# Patient Record
Sex: FEMALE | Race: WHITE | NOT HISPANIC OR LATINO | Employment: FULL TIME | URBAN - METROPOLITAN AREA
[De-identification: names, ages, dates, MRNs, and addresses within clinical notes are randomized per-mention and may not be internally consistent; named-entity substitution may affect disease eponyms.]

---

## 2017-01-04 ENCOUNTER — OFFICE VISIT (OUTPATIENT)
Dept: MEDICAL GROUP | Facility: MEDICAL CENTER | Age: 37
End: 2017-01-04
Payer: COMMERCIAL

## 2017-01-04 VITALS
TEMPERATURE: 97.5 F | SYSTOLIC BLOOD PRESSURE: 126 MMHG | BODY MASS INDEX: 24.06 KG/M2 | OXYGEN SATURATION: 98 % | DIASTOLIC BLOOD PRESSURE: 90 MMHG | HEART RATE: 88 BPM | HEIGHT: 66 IN | WEIGHT: 149.69 LBS | RESPIRATION RATE: 12 BRPM

## 2017-01-04 DIAGNOSIS — G43.109 MIGRAINE WITH AURA AND WITHOUT STATUS MIGRAINOSUS, NOT INTRACTABLE: ICD-10-CM

## 2017-01-04 DIAGNOSIS — N93.9 ABNORMAL UTERINE BLEEDING: ICD-10-CM

## 2017-01-04 DIAGNOSIS — F32.1 MODERATE SINGLE CURRENT EPISODE OF MAJOR DEPRESSIVE DISORDER (HCC): ICD-10-CM

## 2017-01-04 DIAGNOSIS — Z00.00 HEALTH MAINTENANCE EXAMINATION: ICD-10-CM

## 2017-01-04 DIAGNOSIS — Z23 NEED FOR VACCINATION: ICD-10-CM

## 2017-01-04 PROCEDURE — 90686 IIV4 VACC NO PRSV 0.5 ML IM: CPT | Performed by: FAMILY MEDICINE

## 2017-01-04 PROCEDURE — 99214 OFFICE O/P EST MOD 30 MIN: CPT | Mod: 25 | Performed by: FAMILY MEDICINE

## 2017-01-04 PROCEDURE — 90471 IMMUNIZATION ADMIN: CPT | Performed by: FAMILY MEDICINE

## 2017-01-04 RX ORDER — RIZATRIPTAN BENZOATE 10 MG/1
10 TABLET ORAL
COMMUNITY
End: 2017-01-04 | Stop reason: SDUPTHER

## 2017-01-04 RX ORDER — RIZATRIPTAN BENZOATE 10 MG/1
10 TABLET ORAL
Qty: 10 TAB | Refills: 11 | Status: SHIPPED | OUTPATIENT
Start: 2017-01-04 | End: 2018-05-22 | Stop reason: SDUPTHER

## 2017-01-04 RX ORDER — CITALOPRAM 20 MG/1
20 TABLET ORAL DAILY
Qty: 30 TAB | Refills: 5 | Status: SHIPPED | OUTPATIENT
Start: 2017-01-04 | End: 2017-02-16

## 2017-01-04 ASSESSMENT — PATIENT HEALTH QUESTIONNAIRE - PHQ9: CLINICAL INTERPRETATION OF PHQ2 SCORE: 2

## 2017-01-04 NOTE — PROGRESS NOTES
Amber Casey is a 36 y.o. female here for   Chief Complaint   Patient presents with   • Establish Care   • Medication Refill       HPI: Amber is a very pleasant 36-year-old female. She moved to [place] about 1-1/2 years ago. She has 3 children. She is a stay-at-home mom. She lives up in the [place] area.    1. Moderate single current episode of major depressive disorder (HCC)  This is a new problem. She has never discussed this with her provider in the past. She reports one and a half years of feeling down. She didn't move her Raynaud about 1-1/2 years ago. She is feeling like she is not established. She has 3 young children. She stays home with her children and feels like she does not have a good social life. Recently, she is finding difficulty with motivation even getting out of the House. She feels overwhelmed with starting her day. She denies any suicidality. She also has associated anxiety. Anxiety mostly comes when it is time for her to leave the house.    2. Migraine with aura and without status migrainosus, not intractable  This is chronic. She would like a refill of Maxalt. She gets about 6 migraines a year. They're well controlled if she is able to take her triptan. She is not on any birth control pills. She does get aura with her migraine.    3. Need for vaccination  Patient needs her flu shot  Tetanus was done 3 years ago    4. Health maintenance examination  Pap: Done on 11/30/16  Needs flu shot  Tetanus done 3 years ago  Exercise rare    6. Abnormal uterine bleeding  This was been going on for the last 3 months. It is a new problem. She had an US done in the last 2 weeks.   Patient is having breakthrough bleeding with her Mirena IUD. This IUD has been present for the last 3 years. Labs ordered to look for thyroid dysfunction. Transvaginal ultrasound reviewed with the patient which is essentially normal other than slightly enlarged ovarian cysts. Patient to follow-up with OB/GYN. She is considering  "endometrial ablation.       TVUS:   Normal transvaginal appearance of the uterus with sonographically well-positioned IUD    Right larger than left simple ovarian cysts are mildly larger than typical but do not have features suspicious for malignancy  Current medicines (including changes today)  Current Outpatient Prescriptions   Medication Sig Dispense Refill   • citalopram (CELEXA) 20 MG Tab Take 1 Tab by mouth every day. 30 Tab 5   • rizatriptan (MAXALT) 10 MG tablet Take 1 Tab by mouth Once PRN for Migraine. 10 Tab 11     No current facility-administered medications for this visit.     She  has a past medical history of Anxiety; Depression; and Migraine.  She  has past surgical history that includes tonsillectomy.  Social History   Substance Use Topics   • Smoking status: Never Smoker    • Smokeless tobacco: Never Used   • Alcohol Use: Yes      Comment: Occ     Social History     Social History Narrative     Family History   Problem Relation Age of Onset   • Psychiatry Mother    • Hypertension Mother    • Hyperlipidemia Mother    • Hypertension Father    • Hyperlipidemia Father    • Diabetes Paternal Grandmother      Family Status   Relation Status Death Age   • Mother Alive    • Father Alive          ROS  Positive for depression, anxiety, occasional headache, vaginal bleeding  All other systems reviewed and are negative     Objective:     Blood pressure 126/90, pulse 88, temperature 36.4 °C (97.5 °F), resp. rate 12, height 1.676 m (5' 6\"), weight 67.9 kg (149 lb 11.1 oz), SpO2 98 %. Body mass index is 24.17 kg/(m^2).  Physical Exam:    Constitutional: Alert, no distress.  Skin: Warm, dry, good turgor, no rashes in visible areas.  Eye: Equal, round and reactive, conjunctiva clear, lids normal.  ENMT: Lips without lesions, good dentition, oropharynx clear.  Neck: Trachea midline, no masses, no thyromegaly. No cervical or supraclavicular lymphadenopathy.  Respiratory: Unlabored respiratory effort, lungs clear to " auscultation, no wheezes, no ronchi.  Cardiovascular: Normal S1, S2, no murmur, no edema.  Abdomen: Soft, non-tender, no masses, no hepatosplenomegaly.  Psych: Alert and oriented x3, tearful.        Assessment and Plan:   The following treatment plan was discussed    1. Moderate single current episode of major depressive disorder (HCC)  This is a new problem  Labs ordered to look for organic causes of depression and anxiety  Discussed at length a diagnosis of depression and anxiety  Encourage daily exercise, outdoor activities  Referral to psychology for therapy  Celexa prescribed, discussed possible side effects  Follow-up in 6 weeks  - citalopram (CELEXA) 20 MG Tab; Take 1 Tab by mouth every day.  Dispense: 30 Tab; Refill: 5  - REFERRAL TO PSYCHOLOGY    2. Migraine with aura and without status migrainosus, not intractable  Chronic, stable   Refill Maxalt  - rizatriptan (MAXALT) 10 MG tablet; Take 1 Tab by mouth Once PRN for Migraine.  Dispense: 10 Tab; Refill: 11    3. Need for vaccination  Flu shot given  - INFLUENZA VACCINE QUAD INJ >3Y(PF)    4. Health maintenance examination  Pap and vaccines up-to-date  Labs ordered   Exercise discussed  - COMP METABOLIC PANEL; Future  - LIPID PROFILE; Future  - TSH WITH REFLEX TO FT4; Future  - VITAMIN D,25 HYDROXY; Future  - CBC WITH DIFFERENTIAL; Future    5. Abnormal uterine bleeding  This is a new problem. Patient is having breakthrough bleeding with her Mirena IUD. This IUD has been present for the last 3 years. Labs ordered to look for thyroid dysfunction. Transvaginal ultrasound reviewed with the patient which is essentially normal other than slightly enlarged ovarian cysts. Patient to follow-up with OB/GYN. She is considering endometrial ablation.     Records requested.  Followup: Return in about 6 weeks (around 2/15/2017) for labs, depression, anxiety, vaginal bleeding, long.

## 2017-01-04 NOTE — MR AVS SNAPSHOT
"        Amber Casey   2017 9:20 AM   Office Visit   MRN: [id number]    Department:  [place] [address]   Dept Phone:  [phone]    Description:  Female : 1980   Provider:  Chasity Head M.D.           Reason for Visit     Establish Care     Medication Refill           Allergies as of 2017     No Known Allergies      You were diagnosed with     Migraine with aura and without status migrainosus, not intractable   [994971]       Moderate single current episode of major depressive disorder (HCC)   [6618969]       Health maintenance examination   [245468]       Need for vaccination   [718020]         Vital Signs     Blood Pressure Pulse Temperature Respirations Height Weight    126/90 mmHg 88 36.4 °C (97.5 °F) 12 1.676 m (5' 6\") 67.9 kg (149 lb 11.1 oz)    Body Mass Index Oxygen Saturation Smoking Status             24.17 kg/m2 98% Never Smoker          Basic Information     Date Of Birth Sex Race Ethnicity Preferred Language    1980 Female White Unknown English      Your appointments     2017  9:40 AM   Established Patient with Chasity Head M.D.   [facility]    [address]  [address]  [facility] [address] [phone]           You will be receiving a confirmation call a few days before your appointment from our automated call confirmation system.              Problem List              ICD-10-CM Priority Class Noted - Resolved    Migraine with aura and without status migrainosus, not intractable G43.109   2017 - Present    Moderate single current episode of major depressive disorder (HCC) F32.1   2017 - Present      Health Maintenance        Date Due Completion Dates    IMM DTaP/Tdap/Td Vaccine (1 - Tdap) 1999 ---    IMM INFLUENZA (1) 2016 ---    PAP SMEAR 2019            Current Immunizations     Influenza Vaccine Quad Inj (Pf)  Incomplete      Below and/or attached are the " medications your provider expects you to take. Review all of your home medications and newly ordered medications with your provider and/or pharmacist. Follow medication instructions as directed by your provider and/or pharmacist. Please keep your medication list with you and share with your provider. Update the information when medications are discontinued, doses are changed, or new medications (including over-the-counter products) are added; and carry medication information at all times in the event of emergency situations     Allergies:  No Known Allergies          Medications  Valid as of: January 04, 2017 - 10:06 AM    Generic Name Brand Name Tablet Size Instructions for use    Citalopram Hydrobromide (Tab) CELEXA 20 MG Take 1 Tab by mouth every day.        Rizatriptan Benzoate (Tab) MAXALT 10 MG Take 1 Tab by mouth Once PRN for Migraine.        .                 Medicines prescribed today were sent to:     [facility]/PHARMACY #[id number] - [place], [place] - [address]    [address] [place] [address]    Phone: [phone] Fax: [phone]    Open 24 Hours?: No      Medication refill instructions:       If your prescription bottle indicates you have medication refills left, it is not necessary to call your provider’s office. Please contact your pharmacy and they will refill your medication.    If your prescription bottle indicates you do not have any refills left, you may request refills at any time through one of the following ways: The online [facility] system (except Urgent Care), by calling your provider’s office, or by asking your pharmacy to contact your provider’s office with a refill request. Medication refills are processed only during regular business hours and may not be available until the next business day. Your provider may request additional information or to have a follow-up visit with you prior to refilling your medication.   *Please Note: Medication refills are assigned a new Rx number when  refilled electronically. Your pharmacy may indicate that no refills were authorized even though a new prescription for the same medication is available at the pharmacy. Please request the medicine by name with the pharmacy before contacting your provider for a refill.        Your To Do List     Future Labs/Procedures Complete By Expires    CBC WITH DIFFERENTIAL  As directed 1/5/2018    COMP METABOLIC PANEL  As directed 1/5/2018    LIPID PROFILE  As directed 1/5/2018    TSH WITH REFLEX TO FT4  As directed 1/4/2018    VITAMIN D,25 HYDROXY  As directed 1/5/2018         [employer] [place] Access Code: [email or web address]  Expires: 2/3/2017 10:06 AM    Sales Beach  A secure, online tool to manage your health information     [employer]’s Sales Beach® is a secure, online tool that connects you to your personalized health information from the privacy of your home -- day or night - making it very easy for you to manage your healthcare. Once the activation process is completed, you can even access your medical information using the Sales Beach yousif, which is available for free in the Apple Yousif store or Google Play store.     Sales Beach provides the following levels of access (as shown below):   My Chart Features   [facility] Doctor [facility]  Specialists [facility]  Urgent  Care Non-[facility]  Primary Care  Doctor   Email your healthcare team securely and privately 24/7 X X X    Manage appointments: schedule your next appointment; view details of past/upcoming appointments X      Request prescription refills. X      View recent personal medical records, including lab and immunizations X X X X   View health record, including health history, allergies, medications X X X X   Read reports about your outpatient visits, procedures, consult and ER notes X X X X   See your discharge summary, which is a recap of your hospital and/or ER visit that includes your diagnosis, lab results, and care plan. X X       How to register for [place]:  1. Go to   [email or web address].  2. Click on the Sign Up Now box, which takes you to the New Member Sign Up page. You will need to provide the following information:  a. Enter your JLGOV Access Code exactly as it appears at the top of this page. (You will not need to use this code after you’ve completed the sign-up process. If you do not sign up before the expiration date, you must request a new code.)   b. Enter your date of birth.   c. Enter your home email address.   d. Click Submit, and follow the next screen’s instructions.  3. Create a JLGOV ID. This will be your JLGOV login ID and cannot be changed, so think of one that is secure and easy to remember.  4. Create a WoraPayt password. You can change your password at any time.  5. Enter your Password Reset Question and Answer. This can be used at a later time if you forget your password.   6. Enter your e-mail address. This allows you to receive e-mail notifications when new information is available in JLGOV.  7. Click Sign Up. You can now view your health information.    For assistance activating your JLGOV account, call [phone]

## 2017-02-07 ENCOUNTER — HOSPITAL ENCOUNTER (OUTPATIENT)
Dept: LAB | Facility: MEDICAL CENTER | Age: 37
End: 2017-02-07
Attending: FAMILY MEDICINE
Payer: COMMERCIAL

## 2017-02-07 DIAGNOSIS — Z00.00 HEALTH MAINTENANCE EXAMINATION: ICD-10-CM

## 2017-02-07 LAB
25(OH)D3 SERPL-MCNC: 27 NG/ML (ref 30–100)
ALBUMIN SERPL BCP-MCNC: 4.2 G/DL (ref 3.2–4.9)
ALBUMIN/GLOB SERPL: 1.4 G/DL
ALP SERPL-CCNC: 32 U/L (ref 30–99)
ALT SERPL-CCNC: 14 U/L (ref 2–50)
ANION GAP SERPL CALC-SCNC: 8 MMOL/L (ref 0–11.9)
AST SERPL-CCNC: 15 U/L (ref 12–45)
BASOPHILS # BLD AUTO: 0.7 % (ref 0–1.8)
BASOPHILS # BLD: 0.04 K/UL (ref 0–0.12)
BILIRUB SERPL-MCNC: 1 MG/DL (ref 0.1–1.5)
BUN SERPL-MCNC: 11 MG/DL (ref 8–22)
CALCIUM SERPL-MCNC: 9.1 MG/DL (ref 8.4–10.2)
CHLORIDE SERPL-SCNC: 104 MMOL/L (ref 96–112)
CHOLEST SERPL-MCNC: 167 MG/DL (ref 100–199)
CO2 SERPL-SCNC: 27 MMOL/L (ref 20–33)
CREAT SERPL-MCNC: 0.63 MG/DL (ref 0.5–1.4)
EOSINOPHIL # BLD AUTO: 0.29 K/UL (ref 0–0.51)
EOSINOPHIL NFR BLD: 5.1 % (ref 0–6.9)
ERYTHROCYTE [DISTWIDTH] IN BLOOD BY AUTOMATED COUNT: 38.8 FL (ref 35.9–50)
GFR SERPL CREATININE-BSD FRML MDRD: >60 ML/MIN/1.73 M 2
GLOBULIN SER CALC-MCNC: 2.9 G/DL (ref 1.9–3.5)
GLUCOSE SERPL-MCNC: 80 MG/DL (ref 65–99)
HCT VFR BLD AUTO: 41.6 % (ref 37–47)
HDLC SERPL-MCNC: 49 MG/DL
HGB BLD-MCNC: 14.4 G/DL (ref 12–16)
IMM GRANULOCYTES # BLD AUTO: 0.01 K/UL (ref 0–0.11)
IMM GRANULOCYTES NFR BLD AUTO: 0.2 % (ref 0–0.9)
LDLC SERPL CALC-MCNC: 108 MG/DL
LYMPHOCYTES # BLD AUTO: 1.64 K/UL (ref 1–4.8)
LYMPHOCYTES NFR BLD: 28.8 % (ref 22–41)
MCH RBC QN AUTO: 31.8 PG (ref 27–33)
MCHC RBC AUTO-ENTMCNC: 34.6 G/DL (ref 33.6–35)
MCV RBC AUTO: 91.8 FL (ref 81.4–97.8)
MONOCYTES # BLD AUTO: 0.37 K/UL (ref 0–0.85)
MONOCYTES NFR BLD AUTO: 6.5 % (ref 0–13.4)
NEUTROPHILS # BLD AUTO: 3.35 K/UL (ref 2–7.15)
NEUTROPHILS NFR BLD: 58.7 % (ref 44–72)
NRBC # BLD AUTO: 0 K/UL
NRBC BLD AUTO-RTO: 0 /100 WBC
PLATELET # BLD AUTO: 235 K/UL (ref 164–446)
PMV BLD AUTO: 9.8 FL (ref 9–12.9)
POTASSIUM SERPL-SCNC: 3.6 MMOL/L (ref 3.6–5.5)
PROT SERPL-MCNC: 7.1 G/DL (ref 6–8.2)
RBC # BLD AUTO: 4.53 M/UL (ref 4.2–5.4)
SODIUM SERPL-SCNC: 139 MMOL/L (ref 135–145)
TRIGL SERPL-MCNC: 52 MG/DL (ref 0–149)
TSH SERPL DL<=0.005 MIU/L-ACNC: 0.96 UIU/ML (ref 0.35–5.5)
WBC # BLD AUTO: 5.7 K/UL (ref 4.8–10.8)

## 2017-02-07 PROCEDURE — 36415 COLL VENOUS BLD VENIPUNCTURE: CPT

## 2017-02-07 PROCEDURE — 80053 COMPREHEN METABOLIC PANEL: CPT

## 2017-02-07 PROCEDURE — 80061 LIPID PANEL: CPT

## 2017-02-07 PROCEDURE — 85025 COMPLETE CBC W/AUTO DIFF WBC: CPT

## 2017-02-07 PROCEDURE — 82306 VITAMIN D 25 HYDROXY: CPT

## 2017-02-07 PROCEDURE — 84443 ASSAY THYROID STIM HORMONE: CPT

## 2017-02-16 ENCOUNTER — OFFICE VISIT (OUTPATIENT)
Dept: MEDICAL GROUP | Facility: MEDICAL CENTER | Age: 37
End: 2017-02-16
Payer: COMMERCIAL

## 2017-02-16 VITALS
HEART RATE: 78 BPM | SYSTOLIC BLOOD PRESSURE: 122 MMHG | HEIGHT: 66 IN | WEIGHT: 148.15 LBS | BODY MASS INDEX: 23.81 KG/M2 | RESPIRATION RATE: 12 BRPM | TEMPERATURE: 97.4 F | DIASTOLIC BLOOD PRESSURE: 86 MMHG | OXYGEN SATURATION: 97 %

## 2017-02-16 DIAGNOSIS — E78.00 PURE HYPERCHOLESTEROLEMIA: ICD-10-CM

## 2017-02-16 DIAGNOSIS — E55.9 VITAMIN D DEFICIENCY: ICD-10-CM

## 2017-02-16 DIAGNOSIS — F32.1 MODERATE SINGLE CURRENT EPISODE OF MAJOR DEPRESSIVE DISORDER (HCC): ICD-10-CM

## 2017-02-16 PROCEDURE — 99214 OFFICE O/P EST MOD 30 MIN: CPT | Performed by: FAMILY MEDICINE

## 2017-02-16 RX ORDER — FLUOXETINE 10 MG/1
10 CAPSULE ORAL DAILY
Qty: 30 CAP | Refills: 2 | Status: SHIPPED | OUTPATIENT
Start: 2017-02-16 | End: 2017-04-11 | Stop reason: SDUPTHER

## 2017-02-16 NOTE — PROGRESS NOTES
Subjective:   Amber Ocasio is a 36 y.o. female here today for   Chief Complaint   Patient presents with   • Follow-Up     labs and meds       1. Moderate single current episode of major depressive disorder (CMS-HCC)  This is chronic. At the last appointment, we started Celexa 10 mg daily. Patient then tried to increase to 20 mg daily. She states that this has helped with her anxiety, but she is feeling quite sedated on this. She states that it feels like she is on Benadryl. She did have some nausea the first week on this medication but this has since resolved. She denies any suicidality. She is sleeping okay. She is no longer having panic attacks when leaving the house. We did labs to evaluate for secondary causes of depression which were all negative. Her vitamin D is slightly low and she is taking supplement for this.    Lab Results   Component Value Date/Time    SODIUM 139 02/07/2017 10:16 AM    POTASSIUM 3.6 02/07/2017 10:16 AM    CHLORIDE 104 02/07/2017 10:16 AM    CO2 27 02/07/2017 10:16 AM    GLUCOSE 80 02/07/2017 10:16 AM    BUN 11 02/07/2017 10:16 AM    CREATININE 0.63 02/07/2017 10:16 AM      Lab Results   Component Value Date/Time    WBC 5.7 02/07/2017 10:16 AM    RBC 4.53 02/07/2017 10:16 AM    HEMOGLOBIN 14.4 02/07/2017 10:16 AM    HEMATOCRIT 41.6 02/07/2017 10:16 AM    MCV 91.8 02/07/2017 10:16 AM    MCH 31.8 02/07/2017 10:16 AM    MCHC 34.6 02/07/2017 10:16 AM    MPV 9.8 02/07/2017 10:16 AM    NEUTROPHILS-POLYS 58.70 02/07/2017 10:16 AM    LYMPHOCYTES 28.80 02/07/2017 10:16 AM    MONOCYTES 6.50 02/07/2017 10:16 AM    EOSINOPHILS 5.10 02/07/2017 10:16 AM    BASOPHILS 0.70 02/07/2017 10:16 AM      Results for AMBER OCASIO (MRN [id number]) as of 2/16/2017 10:12   Ref. Range 2/7/2017 10:16   25-Hydroxy   Vitamin D 25 Latest Ref Range:  ng/mL 27 (L)   TSH Latest Ref Range: 0.350-5.500 uIU/mL 0.960       2. Vitamin D deficiency  This is chronic. She is currently taking 1000 units  "vitamin D daily.  Results for ESMER OCASIO (MRN [id number]) as of 2/16/2017 10:12   Ref. Range 2/7/2017 10:16   25-Hydroxy   Vitamin D 25 Latest Ref Range:  ng/mL 27 (L)     3. Pure hypercholesterolemia  This is a new problem. She has strong family history of hypercholesterolemia in her siblings and parents. She is her only sibling not on medication. She does not eat a large amount of red meat, milk, butter, or other saturated fats. She does eat some cheese. She does not eat many carbohydrates or sugars.  Results for ESMER OCASIO (MRN [id number]) as of 2/16/2017 10:12   Ref. Range 2/7/2017 10:16   Cholesterol,Tot Latest Ref Range: 100-199 mg/dL 167   Triglycerides Latest Ref Range: 0-149 mg/dL 52   HDL Latest Ref Range: >=40 mg/dL 49   LDL Latest Ref Range: <100 mg/dL 108 (H)       Current medicines (including changes today)  Current Outpatient Prescriptions   Medication Sig Dispense Refill   • fluoxetine (PROZAC) 10 MG Cap Take 1 Cap by mouth every day. 30 Cap 2   • rizatriptan (MAXALT) 10 MG tablet Take 1 Tab by mouth Once PRN for Migraine. 10 Tab 11     No current facility-administered medications for this visit.     She  has a past medical history of Anxiety; Depression; and Migraine.    ROS   No fevers  No bowel changes  No LE edema  Positive for nausea     Objective:     Blood pressure 122/86, pulse 78, temperature 36.3 °C (97.4 °F), resp. rate 12, height 1.676 m (5' 5.98\"), weight 67.2 kg (148 lb 2.4 oz), SpO2 97 %. Body mass index is 23.92 kg/(m^2).   Physical Exam:  Constitutional: Alert, no distress.  Skin: Warm, dry, good turgor, no rashes in visible areas.  Eye: conjunctiva clear, lids normal.  ENMT: Lips without lesions, good dentition.  Neck: Trachea midline  Respiratory: Unlabored respiratory effort  Cardiovascular: Regular rate  Psych: Alert and oriented x3, normal affect and mood.        Assessment and Plan:   The following treatment plan was discussed    1. Moderate single " current episode of major depressive disorder (CMS-HCC)  Chronic, unstable. Side effects from Celexa including sedation  Change to Prozac 10 mg and we will increase as needed  Follow-up again in 6 weeks  Labs reviewed as above  - fluoxetine (PROZAC) 10 MG Cap; Take 1 Cap by mouth every day.  Dispense: 30 Cap; Refill: 2    2. Vitamin D deficiency  Chronic, stable  Increase vitamin D supplementation to 2000 units during winter months    3. Pure hypercholesterolemia  This is a new problem. Discussed lifestyle modifications. She does have a strong family history of hyperlipidemia and would like to stay off of medications. Her  ASCVD risk is very low and I do not think that she needs medication at this time.      Followup: Return in about 6 weeks (around 3/30/2017) for depression .       This note was created using voice recognition software. I have made every reasonable attempt to correct errors, however, I do anticipate some grammatical errors.

## 2017-02-16 NOTE — MR AVS SNAPSHOT
"        Amber Casey   2017 9:40 AM   Office Visit   MRN: [id number]    Department:  [place] [address]   Dept Phone:  [phone]    Description:  Female : 1980   Provider:  Chasity Head M.D.           Reason for Visit     Follow-Up labs and meds      Allergies as of 2017     No Known Allergies      You were diagnosed with     Moderate single current episode of major depressive disorder (CMS-[facility])   [4160159]       Vitamin D deficiency   [8578634]       Pure hypercholesterolemia   [272.0.ICD-9-CM]         Vital Signs     Blood Pressure Pulse Temperature Respirations Height Weight    122/86 mmHg 78 36.3 °C (97.4 °F) 12 1.676 m (5' 5.98\") 67.2 kg (148 lb 2.4 oz)    Body Mass Index Oxygen Saturation Smoking Status             23.92 kg/m2 97% Never Smoker          Basic Information     Date Of Birth Sex Race Ethnicity Preferred Language    1980 Female White Non- English      Your appointments     2017  9:40 AM   Established Patient with Chasity Head M.D.   [facility] (--)    [address] [address] [phone]           You will be receiving a confirmation call a few days before your appointment from our automated call confirmation system.              Problem List              ICD-10-CM Priority Class Noted - Resolved    Migraine with aura and without status migrainosus, not intractable G43.109   2017 - Present    Moderate single current episode of major depressive disorder (CMS-[facility]) F32.1   2017 - Present    Abnormal uterine bleeding N93.9   2017 - Present    Vitamin D deficiency E55.9   2017 - Present    Pure hypercholesterolemia E78.00   2017 - Present      Health Maintenance        Date Due Completion Dates    IMM DTaP/Tdap/Td Vaccine (1 - Tdap) 1999 ---    PAP SMEAR 2019            Current Immunizations     Influenza Vaccine Quad Inj (Pf) 2017   "      Below and/or attached are the medications your provider expects you to take. Review all of your home medications and newly ordered medications with your provider and/or pharmacist. Follow medication instructions as directed by your provider and/or pharmacist. Please keep your medication list with you and share with your provider. Update the information when medications are discontinued, doses are changed, or new medications (including over-the-counter products) are added; and carry medication information at all times in the event of emergency situations     Allergies:  No Known Allergies          Medications  Valid as of: February 16, 2017 - 10:11 AM    Generic Name Brand Name Tablet Size Instructions for use    FLUoxetine HCl (Cap) PROZAC 10 MG Take 1 Cap by mouth every day.        Rizatriptan Benzoate (Tab) MAXALT 10 MG Take 1 Tab by mouth Once PRN for Migraine.        .                 Medicines prescribed today were sent to:     [facility]/PHARMACY #[id number] - [place], [place] - [address]    [address] [place] [address]    Phone: [phone] Fax: [phone]    Open 24 Hours?: No      Medication refill instructions:       If your prescription bottle indicates you have medication refills left, it is not necessary to call your provider’s office. Please contact your pharmacy and they will refill your medication.    If your prescription bottle indicates you do not have any refills left, you may request refills at any time through one of the following ways: The online [employer] system (except Urgent Care), by calling your provider’s office, or by asking your pharmacy to contact your provider’s office with a refill request. Medication refills are processed only during regular business hours and may not be available until the next business day. Your provider may request additional information or to have a follow-up visit with you prior to refilling your medication.   *Please Note: Medication refills are assigned a  new Rx number when refilled electronically. Your pharmacy may indicate that no refills were authorized even though a new prescription for the same medication is available at the pharmacy. Please request the medicine by name with the pharmacy before contacting your provider for a refill.           MyChart Access Code: Activation code not generated  Current [email or web address] Status: Active

## 2017-04-11 ENCOUNTER — OFFICE VISIT (OUTPATIENT)
Dept: MEDICAL GROUP | Facility: LAB | Age: 37
End: 2017-04-11
Payer: COMMERCIAL

## 2017-04-11 ENCOUNTER — APPOINTMENT (OUTPATIENT)
Dept: MEDICAL GROUP | Facility: LAB | Age: 37
End: 2017-04-11
Payer: COMMERCIAL

## 2017-04-11 VITALS
RESPIRATION RATE: 12 BRPM | TEMPERATURE: 98.2 F | HEIGHT: 66 IN | DIASTOLIC BLOOD PRESSURE: 80 MMHG | SYSTOLIC BLOOD PRESSURE: 106 MMHG | BODY MASS INDEX: 23.07 KG/M2 | HEART RATE: 83 BPM | OXYGEN SATURATION: 100 % | WEIGHT: 143.52 LBS

## 2017-04-11 DIAGNOSIS — E78.00 PURE HYPERCHOLESTEROLEMIA: ICD-10-CM

## 2017-04-11 DIAGNOSIS — E55.9 VITAMIN D DEFICIENCY: ICD-10-CM

## 2017-04-11 DIAGNOSIS — F32.1 MODERATE SINGLE CURRENT EPISODE OF MAJOR DEPRESSIVE DISORDER (HCC): ICD-10-CM

## 2017-04-11 DIAGNOSIS — Z00.00 HEALTH MAINTENANCE EXAMINATION: ICD-10-CM

## 2017-04-11 PROCEDURE — 99395 PREV VISIT EST AGE 18-39: CPT | Performed by: FAMILY MEDICINE

## 2017-04-11 RX ORDER — FLUOXETINE HYDROCHLORIDE 20 MG/1
20 CAPSULE ORAL DAILY
Qty: 90 CAP | Refills: 3 | Status: SHIPPED | OUTPATIENT
Start: 2017-04-11 | End: 2018-04-04 | Stop reason: SDUPTHER

## 2017-04-11 NOTE — MR AVS SNAPSHOT
"        Amber Casey   2017 10:40 AM   Office Visit   MRN: [id number]    Department:  [facility]   Dept Phone:  [phone]    Description:  Female : 1980   Provider:  Chasity Head M.D.           Reason for Visit     Follow-Up     Depression meds      Allergies as of 2017     No Known Allergies      You were diagnosed with     Moderate single current episode of major depressive disorder (CMS-HCC)   [4853165]       Vitamin D deficiency   [8548594]       Pure hypercholesterolemia   [272.0.ICD-9-CM]       Health maintenance examination   [603160]         Vital Signs     Blood Pressure Pulse Temperature Respirations Height Weight    106/80 mmHg 83 36.8 °C (98.2 °F) 12 1.676 m (5' 5.98\") 65.1 kg (143 lb 8.3 oz)    Body Mass Index Oxygen Saturation Smoking Status             23.18 kg/m2 100% Never Smoker          Basic Information     Date Of Birth Sex Race Ethnicity Preferred Language    1980 Female White Non- English      Problem List              ICD-10-CM Priority Class Noted - Resolved    Migraine with aura and without status migrainosus, not intractable G43.109   2017 - Present    Moderate single current episode of major depressive disorder (CMS-HCC) F32.1   2017 - Present    Abnormal uterine bleeding N93.9   2017 - Present    Vitamin D deficiency E55.9   2017 - Present    Pure hypercholesterolemia E78.00   2017 - Present      Health Maintenance        Date Due Completion Dates    IMM DTaP/Tdap/Td Vaccine (1 - Tdap) 1999 ---    PAP SMEAR 2019            Current Immunizations     Influenza Vaccine Quad Inj (Pf) 2017      Below and/or attached are the medications your provider expects you to take. Review all of your home medications and newly ordered medications with your provider and/or pharmacist. Follow medication instructions as directed by your provider and/or pharmacist. Please keep your medication list " with you and share with your provider. Update the information when medications are discontinued, doses are changed, or new medications (including over-the-counter products) are added; and carry medication information at all times in the event of emergency situations     Allergies:  No Known Allergies          Medications  Valid as of: April 11, 2017 - 10:50 AM    Generic Name Brand Name Tablet Size Instructions for use    FLUoxetine HCl (Cap) PROZAC 20 MG Take 1 Cap by mouth every day.        Rizatriptan Benzoate (Tab) MAXALT 10 MG Take 1 Tab by mouth Once PRN for Migraine.        .                 Medicines prescribed today were sent to:     [facility][id number] - [place] [place] [address]    [address] [place] [address]    Phone: [phone] Fax: [phone]    Open 24 Hours?: No      Medication refill instructions:       If your prescription bottle indicates you have medication refills left, it is not necessary to call your provider’s office. Please contact your pharmacy and they will refill your medication.    If your prescription bottle indicates you do not have any refills left, you may request refills at any time through one of the following ways: The online [employer] system (except Urgent Care), by calling your provider’s office, or by asking your pharmacy to contact your provider’s office with a refill request. Medication refills are processed only during regular business hours and may not be available until the next business day. Your provider may request additional information or to have a follow-up visit with you prior to refilling your medication.   *Please Note: Medication refills are assigned a new Rx number when refilled electronically. Your pharmacy may indicate that no refills were authorized even though a new prescription for the same medication is available at the pharmacy. Please request the medicine by name with the pharmacy before contacting your provider for a refill.        Your  To Do List     Future Labs/Procedures Complete By Expires    COMP METABOLIC PANEL  As directed 4/12/2018    LIPID PROFILE  As directed 4/12/2018    TSH WITH REFLEX TO FT4  As directed 4/11/2018    VITAMIN D,25 HYDROXY  As directed 4/12/2018         MyChart Access Code: Activation code not generated  Current MyChart Status: Active

## 2017-04-11 NOTE — PROGRESS NOTES
Subjective:   Amber Casey is a 36 y.o. female here today for annual, depression  Chief Complaint   Patient presents with   • Follow-Up   • Depression     meds       1. Health maintenance examination  Pap: Last done 11/30/16, up to date  STD screen: Monogamous  Mammogram: Not indicated  Colonoscopy: Not indicated  DEXA: Not indicated  Tetanus booster: Last done 3 years ago during pregnancy  Flu vaccine: Done yearly   Diet: healthy   Exercise: Has started walking    2. Moderate single current episode of major depressive disorder (CMS-HCC)  This is chronic. Patient did try Celexa but was left with a groggy feeling in the morning. At last visit, we changed her to Prozac 10 mg daily. She started taking 20 mg after a few weeks because she felt like she was at a plateau. She feels like 20 mg of Prozac is working well for her. She is sleeping well at night and does not feel groggy in the morning. She denies any suicidality. Her anxiety is under control. She is able to get out and walk and go to social events without any significant concerns.    3. Vitamin D deficiency  This is chronic. Patient started vitamin D supplementation and did increase this to 2000 units daily. She is feeling good on this dose.    4. Pure hypercholesterolemia  This is chronic. Patient had a slightly elevated LDL level above 100. She has lost 5 pounds and is working on exercise.      Current medicines (including changes today)  Current Outpatient Prescriptions   Medication Sig Dispense Refill   • fluoxetine (PROZAC) 20 MG Cap Take 1 Cap by mouth every day. 90 Cap 3   • rizatriptan (MAXALT) 10 MG tablet Take 1 Tab by mouth Once PRN for Migraine. 10 Tab 11     No current facility-administered medications for this visit.     She  has a past medical history of Anxiety; Depression; and Migraine.    ROS   No fevers  No bowel changes  No nausea       Objective:     Blood pressure 106/80, pulse 83, temperature 36.8 °C (98.2 °F), resp. rate 12,  "height 1.676 m (5' 5.98\"), weight 65.1 kg (143 lb 8.3 oz), SpO2 100 %. Body mass index is 23.18 kg/(m^2).   Physical Exam:  Constitutional: Alert, no distress.  Skin: Warm, dry, good turgor, no rashes in visible areas.  Eye: Equal, round and reactive, conjunctiva clear, lids normal.  ENMT: Lips without lesions, good dentition, oropharynx clear.  Neck: Trachea midline, no masses, no thyromegaly. No cervical or supraclavicular lymphadenopathy  Respiratory: Unlabored respiratory effort, lungs clear to auscultation, no wheezes, no ronchi.  Cardiovascular: Normal S1, S2, RRR, no murmur, no edema.  Abdomen: Soft, non-tender, no masses, no hepatosplenomegaly.  Psych: Alert and oriented x3, normal affect and mood.      Assessment and Plan:   The following treatment plan was discussed    1. Health maintenance examination  Labs ordered  Diet and exercise discussed  Vaccinations up-to-date  - LIPID PROFILE; Future  - TSH WITH REFLEX TO FT4; Future  - COMP METABOLIC PANEL; Future  - VITAMIN D,25 HYDROXY; Future    2. Moderate single current episode of major depressive disorder (CMS-HCC)  Chronic, improved  Increase prozac to 20 mg daily   Follow up yearly, sooner with any side effects  - fluoxetine (PROZAC) 20 MG Cap; Take 1 Cap by mouth every day.  Dispense: 90 Cap; Refill: 3    3. Vitamin D deficiency  Chronic, stable  Continue 2000 units of vitamin D daily  Recheck February 2018  - VITAMIN D,25 HYDROXY; Future    4. Pure hypercholesterolemia  Chronic, stable  Patient has lost 5 pounds, encouragement given  Recheck lipid profile with a TSH in 10 months  - LIPID PROFILE; Future  - TSH WITH REFLEX TO FT4; Future      Followup: Return in about 10 months (around 2/11/2018) for Annual, labs.       This note was created using voice recognition software. I have made every reasonable attempt to correct errors, however, I do anticipate some grammatical errors.     "

## 2017-07-21 ENCOUNTER — OFFICE VISIT (OUTPATIENT)
Dept: MEDICAL GROUP | Facility: LAB | Age: 37
End: 2017-07-21
Payer: COMMERCIAL

## 2017-07-21 ENCOUNTER — HOSPITAL ENCOUNTER (OUTPATIENT)
Facility: MEDICAL CENTER | Age: 37
End: 2017-07-21
Attending: FAMILY MEDICINE
Payer: COMMERCIAL

## 2017-07-21 VITALS
OXYGEN SATURATION: 97 % | SYSTOLIC BLOOD PRESSURE: 110 MMHG | WEIGHT: 148 LBS | DIASTOLIC BLOOD PRESSURE: 78 MMHG | BODY MASS INDEX: 23.78 KG/M2 | RESPIRATION RATE: 16 BRPM | HEART RATE: 100 BPM | TEMPERATURE: 97.7 F | HEIGHT: 66 IN

## 2017-07-21 DIAGNOSIS — N30.00 ACUTE CYSTITIS WITHOUT HEMATURIA: ICD-10-CM

## 2017-07-21 LAB
APPEARANCE UR: NORMAL
BILIRUB UR STRIP-MCNC: NORMAL MG/DL
COLOR UR AUTO: NORMAL
GLUCOSE UR STRIP.AUTO-MCNC: NORMAL MG/DL
KETONES UR STRIP.AUTO-MCNC: NORMAL MG/DL
LEUKOCYTE ESTERASE UR QL STRIP.AUTO: NORMAL
NITRITE UR QL STRIP.AUTO: NORMAL
PH UR STRIP.AUTO: 5.5 [PH] (ref 5–8)
PROT UR QL STRIP: NORMAL MG/DL
RBC UR QL AUTO: NORMAL
SP GR UR STRIP.AUTO: 1.03
UROBILINOGEN UR STRIP-MCNC: 0.2 MG/DL

## 2017-07-21 PROCEDURE — 87086 URINE CULTURE/COLONY COUNT: CPT

## 2017-07-21 PROCEDURE — 87077 CULTURE AEROBIC IDENTIFY: CPT

## 2017-07-21 PROCEDURE — 81002 URINALYSIS NONAUTO W/O SCOPE: CPT | Performed by: FAMILY MEDICINE

## 2017-07-21 PROCEDURE — 99000 SPECIMEN HANDLING OFFICE-LAB: CPT | Performed by: FAMILY MEDICINE

## 2017-07-21 PROCEDURE — 87186 SC STD MICRODIL/AGAR DIL: CPT

## 2017-07-21 PROCEDURE — 99214 OFFICE O/P EST MOD 30 MIN: CPT | Performed by: FAMILY MEDICINE

## 2017-07-21 RX ORDER — NITROFURANTOIN 25; 75 MG/1; MG/1
100 CAPSULE ORAL 2 TIMES DAILY
Qty: 10 CAP | Refills: 0 | Status: SHIPPED | OUTPATIENT
Start: 2017-07-21 | End: 2017-07-26

## 2017-07-21 RX ORDER — CLINDAMYCIN PHOSPHATE AND TRETINOIN GEL 1.2%/0.025% 10; .25 MG/G; MG/G
GEL TOPICAL NIGHTLY
COMMUNITY
End: 2020-06-24

## 2017-07-21 NOTE — MR AVS SNAPSHOT
"        Amber Casey   2017 2:20 PM   Office Visit   MRN: [id number]    Department:  [facility]   Dept Phone:  [phone]    Description:  Female : 1980   Provider:  Chasity Head M.D.           Reason for Visit     Dysuria           Allergies as of 2017     No Known Allergies      You were diagnosed with     Acute cystitis without hematuria   [946614]         Vital Signs     Blood Pressure Pulse Temperature Respirations Height Weight    110/78 mmHg 100 36.5 °C (97.7 °F) 16 1.676 m (5' 5.98\") 67.132 kg (148 lb)    Body Mass Index Oxygen Saturation Smoking Status             23.90 kg/m2 97% Never Smoker          Basic Information     Date Of Birth Sex Race Ethnicity Preferred Language    1980 Female White Non- English      Your appointments     2017  2:20 PM   Established Patient with Chasity Head M.D.   [facility] (--)    [address] [address] [id number] [phone]           You will be receiving a confirmation call a few days before your appointment from our automated call confirmation system.              Problem List              ICD-10-CM Priority Class Noted - Resolved    Migraine with aura and without status migrainosus, not intractable G43.109   2017 - Present    Moderate single current episode of major depressive disorder (CMS-HCC) F32.1   2017 - Present    Abnormal uterine bleeding N93.9   2017 - Present    Vitamin D deficiency E55.9   2017 - Present    Pure hypercholesterolemia E78.00   2017 - Present      Health Maintenance        Date Due Completion Dates    IMM INFLUENZA (1) 2017    PAP SMEAR 2019    IMM DTaP/Tdap/Td Vaccine (2 - Td) 2023            Results     POCT Urinalysis      Component Value Standard Range & Units    POC Color alesha Negative    POC Appearance cloudy Negative    POC Leukocyte Esterase + " Negative    POC Nitrites pos Negative    POC Urobiligen 0.2 Negative (0.2) mg/dL    POC Protein + Negative mg/dL    POC Urine PH 5.5 5.0 - 8.0    POC Blood 3+ Negative    POC Specific Gravity 1.030 <1.005 - >1.030    POC Ketones neg Negative mg/dL    POC Biliruben neg Negative mg/dL    POC Glucose neg Negative mg/dL                        Current Immunizations     Influenza Vaccine Quad Inj (Pf) 1/4/2017    Tdap Vaccine 4/11/2013      Below and/or attached are the medications your provider expects you to take. Review all of your home medications and newly ordered medications with your provider and/or pharmacist. Follow medication instructions as directed by your provider and/or pharmacist. Please keep your medication list with you and share with your provider. Update the information when medications are discontinued, doses are changed, or new medications (including over-the-counter products) are added; and carry medication information at all times in the event of emergency situations     Allergies:  No Known Allergies          Medications  Valid as of: July 21, 2017 -  2:11 PM    Generic Name Brand Name Tablet Size Instructions for use    Clindamycin-Tretinoin (Gel) ZIANA 1.2-0.025 % Apply  to affected area(s) every evening.        FLUoxetine HCl (Cap) PROZAC 20 MG Take 1 Cap by mouth every day.        Nitrofurantoin Monohyd Macro (Cap) MACROBID 100 MG Take 1 Cap by mouth 2 times a day for 5 days.        Rizatriptan Benzoate (Tab) MAXALT 10 MG Take 1 Tab by mouth Once PRN for Migraine.        .                 Medicines prescribed today were sent to:     [facility]/PHARMACY #[id number] - [place] - [address]    [address] [place] [address]    Phone: [phone] Fax: [phone]    Open 24 Hours?: No      Medication refill instructions:       If your prescription bottle indicates you have medication refills left, it is not necessary to call your provider’s office. Please contact your pharmacy and they will  refill your medication.    If your prescription bottle indicates you do not have any refills left, you may request refills at any time through one of the following ways: The online Glass system (except Urgent Care), by calling your provider’s office, or by asking your pharmacy to contact your provider’s office with a refill request. Medication refills are processed only during regular business hours and may not be available until the next business day. Your provider may request additional information or to have a follow-up visit with you prior to refilling your medication.   *Please Note: Medication refills are assigned a new Rx number when refilled electronically. Your pharmacy may indicate that no refills were authorized even though a new prescription for the same medication is available at the pharmacy. Please request the medicine by name with the pharmacy before contacting your provider for a refill.           Glass Access Code: Activation code not generated  Current Glass Status: Active

## 2017-07-22 DIAGNOSIS — N30.00 ACUTE CYSTITIS WITHOUT HEMATURIA: ICD-10-CM

## 2017-07-23 NOTE — PROGRESS NOTES
"Chief Complaint   Patient presents with   • Dysuria         Symptom onset: 1 days ago   Current symptoms: Painful, urgent, frequent voids. No blood noted in urine.  Since onset symptoms are: Unchanged  Treatments tried: none  Associated symptoms: Negative for fever, flank pain, nausea and vomiting, vaginal discharge, pelvic pain.  History is negative for frequent UTI.     OBJECTIVE:  Blood pressure 110/78, pulse 100, temperature 36.5 °C (97.7 °F), resp. rate 16, height 1.676 m (5' 5.98\"), weight 67.132 kg (148 lb), SpO2 97 %.  General: No acute distress, WN/WD  HEENT: NC/AT, lids normal without lesions, good dentition  Neck: Trachea midline  Cardiovascular: Regular Rate  Pulmonary: No respiratory distress  Skin: No rashes noted  Neurologic: CN II-XII grossly intact, normal gait  Psych: Alert and oriented x 3, normal insight and judgement  Abdomen soft, tender in suprapubic region. No CVAT. Normal bowel sounds.    Lab Results   Component Value Date/Time    POC COLOR alesha 07/21/2017 02:06 PM    POC APPEARANCE cloudy 07/21/2017 02:06 PM    POC LEUKOCYTE ESTERASE + 07/21/2017 02:06 PM    POC NITRITES pos 07/21/2017 02:06 PM    POC UROBILIGEN 0.2 07/21/2017 02:06 PM    POC PROTEIN + 07/21/2017 02:06 PM    POC URINE PH 5.5 07/21/2017 02:06 PM    POC BLOOD 3+ 07/21/2017 02:06 PM    POC SPECIFIC GRAVITY 1.030 07/21/2017 02:06 PM    POC KETONES neg 07/21/2017 02:06 PM    POC BILIRUBEN neg 07/21/2017 02:06 PM    POC GLUCOSE neg 07/21/2017 02:06 PM         ASSESSMENT:    1. Acute cystitis without hematuria  New, no red flags  Treat with macrobid  Send urine for culture. Start antibiotics.  RTC if symptoms not improving within 3-4 days or in case of vomiting, fever, increasing pain.   EDUCATION: drink plenty of fluids, wipe front to back every void and BM.     "

## 2017-07-24 LAB
BACTERIA UR CULT: ABNORMAL
SIGNIFICANT IND 70042: ABNORMAL
SITE SITE: ABNORMAL
SOURCE SOURCE: ABNORMAL

## 2017-10-20 ENCOUNTER — TELEPHONE (OUTPATIENT)
Dept: MEDICAL GROUP | Facility: LAB | Age: 37
End: 2017-10-20

## 2017-10-20 NOTE — TELEPHONE ENCOUNTER
1. Caller Name: Amber Casey                                         Call Back Number: [phone] (home)         Patient approves a detailed voicemail message: yes    Patient called want to est. care for her children with Dr. Head and Dr. Esposito approved.

## 2018-04-04 DIAGNOSIS — F32.1 MODERATE SINGLE CURRENT EPISODE OF MAJOR DEPRESSIVE DISORDER (HCC): ICD-10-CM

## 2018-04-04 RX ORDER — FLUOXETINE HYDROCHLORIDE 20 MG/1
CAPSULE ORAL
Qty: 90 CAP | Refills: 3 | Status: SHIPPED | OUTPATIENT
Start: 2018-04-04 | End: 2019-03-24 | Stop reason: SDUPTHER

## 2018-05-22 DIAGNOSIS — G43.109 MIGRAINE WITH AURA AND WITHOUT STATUS MIGRAINOSUS, NOT INTRACTABLE: ICD-10-CM

## 2018-05-22 RX ORDER — RIZATRIPTAN BENZOATE 10 MG/1
TABLET ORAL
Qty: 10 TAB | Refills: 5 | Status: SHIPPED | OUTPATIENT
Start: 2018-05-22 | End: 2019-08-16 | Stop reason: SDUPTHER

## 2018-07-25 ENCOUNTER — TELEPHONE (OUTPATIENT)
Dept: MEDICAL GROUP | Facility: LAB | Age: 38
End: 2018-07-25

## 2018-07-26 ENCOUNTER — HOSPITAL ENCOUNTER (OUTPATIENT)
Dept: LAB | Facility: MEDICAL CENTER | Age: 38
End: 2018-07-26
Attending: FAMILY MEDICINE
Payer: COMMERCIAL

## 2018-07-26 ENCOUNTER — OFFICE VISIT (OUTPATIENT)
Dept: MEDICAL GROUP | Facility: LAB | Age: 38
End: 2018-07-26
Payer: COMMERCIAL

## 2018-07-26 VITALS
DIASTOLIC BLOOD PRESSURE: 80 MMHG | HEIGHT: 66 IN | RESPIRATION RATE: 12 BRPM | HEART RATE: 88 BPM | BODY MASS INDEX: 26.68 KG/M2 | WEIGHT: 166 LBS | TEMPERATURE: 97.6 F | OXYGEN SATURATION: 97 % | SYSTOLIC BLOOD PRESSURE: 118 MMHG

## 2018-07-26 DIAGNOSIS — R23.2 HOT FLASHES: ICD-10-CM

## 2018-07-26 DIAGNOSIS — R63.5 WEIGHT GAIN: ICD-10-CM

## 2018-07-26 DIAGNOSIS — R61 NIGHT SWEATS: ICD-10-CM

## 2018-07-26 DIAGNOSIS — Z00.00 HEALTH MAINTENANCE EXAMINATION: ICD-10-CM

## 2018-07-26 LAB
BASOPHILS # BLD AUTO: 1 % (ref 0–1.8)
BASOPHILS # BLD: 0.07 K/UL (ref 0–0.12)
EOSINOPHIL # BLD AUTO: 0.3 K/UL (ref 0–0.51)
EOSINOPHIL NFR BLD: 4.3 % (ref 0–6.9)
ERYTHROCYTE [DISTWIDTH] IN BLOOD BY AUTOMATED COUNT: 40.4 FL (ref 35.9–50)
HCT VFR BLD AUTO: 41.6 % (ref 37–47)
HGB BLD-MCNC: 14.5 G/DL (ref 12–16)
IMM GRANULOCYTES # BLD AUTO: 0.02 K/UL (ref 0–0.11)
IMM GRANULOCYTES NFR BLD AUTO: 0.3 % (ref 0–0.9)
LYMPHOCYTES # BLD AUTO: 1.93 K/UL (ref 1–4.8)
LYMPHOCYTES NFR BLD: 27.8 % (ref 22–41)
MCH RBC QN AUTO: 32.7 PG (ref 27–33)
MCHC RBC AUTO-ENTMCNC: 34.9 G/DL (ref 33.6–35)
MCV RBC AUTO: 93.7 FL (ref 81.4–97.8)
MONOCYTES # BLD AUTO: 0.49 K/UL (ref 0–0.85)
MONOCYTES NFR BLD AUTO: 7.1 % (ref 0–13.4)
NEUTROPHILS # BLD AUTO: 4.14 K/UL (ref 2–7.15)
NEUTROPHILS NFR BLD: 59.5 % (ref 44–72)
NRBC # BLD AUTO: 0 K/UL
NRBC BLD-RTO: 0 /100 WBC
PLATELET # BLD AUTO: 247 K/UL (ref 164–446)
PMV BLD AUTO: 9.9 FL (ref 9–12.9)
RBC # BLD AUTO: 4.44 M/UL (ref 4.2–5.4)
WBC # BLD AUTO: 7 K/UL (ref 4.8–10.8)

## 2018-07-26 PROCEDURE — 85025 COMPLETE CBC W/AUTO DIFF WBC: CPT

## 2018-07-26 PROCEDURE — 83001 ASSAY OF GONADOTROPIN (FSH): CPT

## 2018-07-26 PROCEDURE — 84439 ASSAY OF FREE THYROXINE: CPT

## 2018-07-26 PROCEDURE — 83002 ASSAY OF GONADOTROPIN (LH): CPT

## 2018-07-26 PROCEDURE — 84443 ASSAY THYROID STIM HORMONE: CPT

## 2018-07-26 PROCEDURE — 82670 ASSAY OF TOTAL ESTRADIOL: CPT

## 2018-07-26 PROCEDURE — 36415 COLL VENOUS BLD VENIPUNCTURE: CPT

## 2018-07-26 PROCEDURE — 80053 COMPREHEN METABOLIC PANEL: CPT

## 2018-07-26 PROCEDURE — 99395 PREV VISIT EST AGE 18-39: CPT | Performed by: FAMILY MEDICINE

## 2018-07-26 ASSESSMENT — PATIENT HEALTH QUESTIONNAIRE - PHQ9: CLINICAL INTERPRETATION OF PHQ2 SCORE: 0

## 2018-07-26 NOTE — PROGRESS NOTES
Subjective:   Amber Casey is a 38 y.o. female here today for   Chief Complaint   Patient presents with   • Sweats     night x 7 months/day 2-3 months       1. Health maintenance examination  Pap smear was last done in 2016.  She gets a flu shot every year.  Tetanus booster is up-to-date was lost in 2013.  She is not exercising regularly.  She is not getting his healthy over the summer.  She would like to lose weight.  She is in a monogamous relationship.  She uses an IUD for contraception    2. Hot flashes  3. Night sweats  4. Weight gain  This is a new problem to discuss with me.  Since December, patient reports having nightly or twice a night drenching night sweats.  She wakes up and has to change her clothes.  Over the last couple of months, this is worsened and she also is having hot flashes throughout the day.  She does have an IUD and so her periods are sporadic but she does occasionally have vaginal bleeding.  No uterine cramping, no breast tenderness or lumps that she has noticed.  She has had weight gain.  She feels as though she is having intolerance to the heat.  She denies any fevers or chills.  She states that her mother went into surgical menopause in her late 40s with a hysterectomy.  She has not had any mood changes.  This is been a very stressful year for her and her       Current medicines (including changes today)  Current Outpatient Prescriptions   Medication Sig Dispense Refill   • FLUoxetine (PROZAC) 20 MG Cap TAKE 1 CAPSULE BY MOUTH EVERY DAY. 90 Cap 3   • rizatriptan (MAXALT) 10 MG tablet TAKE 1 TABLET BY MOUTH ONCE AS NEEDED FOR MIGRAINE. 10 Tab 5   • clindamycin-tretinoin (VELTIN) gel Apply  to affected area(s) every evening.       No current facility-administered medications for this visit.      She  has a past medical history of Anxiety; Depression; and Migraine.    ROS   No fevers  No bowel changes  No LE edema       Objective:     Blood pressure 118/80, pulse 88,  "temperature 36.4 °C (97.6 °F), resp. rate 12, height 1.676 m (5' 5.98\"), weight 75.3 kg (166 lb), SpO2 97 %. Body mass index is 26.81 kg/m².   Physical Exam:  Constitutional: Alert, no distress.  Skin: Warm, dry, good turgor, no rashes in visible areas.  Eye: Equal, round and reactive, conjunctiva clear, lids normal.  ENMT: Lips without lesions, good dentition, oropharynx clear.  Neck: Trachea midline, no masses, no thyromegaly. No cervical or supraclavicular lymphadenopathy  Respiratory: Unlabored respiratory effort, lungs clear to auscultation, no wheezes, no ronchi.  Cardiovascular: Normal S1, S2, RRR, no murmur, no edema.  Abdomen: Soft, non-tender, no masses, no hepatosplenomegaly.  Psych: Alert and oriented x3, normal affect and mood.      Assessment and Plan:   The following treatment plan was discussed    1. Health maintenance examination  Age-appropriate counseling given, vaccinations up-to-date, patient will be due for Pap smear next year.  Discussed mammograms at age 40.  Diet exercise discussed today    2. Hot flashes  3. Night sweats  4. Weight gain  This is a new problem, uncontrolled.  At this point etiology is unclear.  Laboratory investigation as below.  We discussed potential differential diagnoses including thyroid abnormality, menopause, electrolyte abnormality.  If no findings on her labs, we will need further evaluation  - COMP METABOLIC PANEL; Future  - CBC WITH DIFFERENTIAL; Future  - FSH/LH; Future  - ESTRADIOL; Future  - TSH; Future  - FREE THYROXINE; Future      Followup: Return in about 1 year (around 7/26/2019), or if symptoms worsen or fail to improve, for Annual.  If there are any lab abnormalities or no significant findings, she will need to be seen sooner     This note was created using voice recognition software. I have made every reasonable attempt to correct errors, however, I do anticipate some grammatical errors.   "

## 2018-07-27 LAB
ALBUMIN SERPL BCP-MCNC: 4.5 G/DL (ref 3.2–4.9)
ALBUMIN/GLOB SERPL: 1.7 G/DL
ALP SERPL-CCNC: 38 U/L (ref 30–99)
ALT SERPL-CCNC: 13 U/L (ref 2–50)
ANION GAP SERPL CALC-SCNC: 6 MMOL/L (ref 0–11.9)
AST SERPL-CCNC: 16 U/L (ref 12–45)
BILIRUB SERPL-MCNC: 0.5 MG/DL (ref 0.1–1.5)
BUN SERPL-MCNC: 15 MG/DL (ref 8–22)
CALCIUM SERPL-MCNC: 9.4 MG/DL (ref 8.5–10.5)
CHLORIDE SERPL-SCNC: 105 MMOL/L (ref 96–112)
CO2 SERPL-SCNC: 27 MMOL/L (ref 20–33)
CREAT SERPL-MCNC: 0.62 MG/DL (ref 0.5–1.4)
ESTRADIOL SERPL-MCNC: 316 PG/ML
FSH SERPL-ACNC: 2.6 MIU/ML
GLOBULIN SER CALC-MCNC: 2.6 G/DL (ref 1.9–3.5)
GLUCOSE SERPL-MCNC: 72 MG/DL (ref 65–99)
LH SERPL-ACNC: 1 IU/L
POTASSIUM SERPL-SCNC: 4 MMOL/L (ref 3.6–5.5)
PROT SERPL-MCNC: 7.1 G/DL (ref 6–8.2)
SODIUM SERPL-SCNC: 138 MMOL/L (ref 135–145)
T4 FREE SERPL-MCNC: 1.03 NG/DL (ref 0.53–1.43)
TSH SERPL DL<=0.005 MIU/L-ACNC: 1.26 UIU/ML (ref 0.38–5.33)

## 2018-08-09 ENCOUNTER — PATIENT MESSAGE (OUTPATIENT)
Dept: MEDICAL GROUP | Facility: LAB | Age: 38
End: 2018-08-09

## 2018-08-09 DIAGNOSIS — Z11.1 SCREENING FOR TUBERCULOSIS: ICD-10-CM

## 2018-08-13 ENCOUNTER — HOSPITAL ENCOUNTER (OUTPATIENT)
Dept: LAB | Facility: MEDICAL CENTER | Age: 38
End: 2018-08-13
Attending: FAMILY MEDICINE
Payer: COMMERCIAL

## 2018-08-13 DIAGNOSIS — Z11.1 SCREENING FOR TUBERCULOSIS: ICD-10-CM

## 2018-08-13 PROCEDURE — 36415 COLL VENOUS BLD VENIPUNCTURE: CPT

## 2018-08-13 PROCEDURE — 86480 TB TEST CELL IMMUN MEASURE: CPT

## 2018-08-15 LAB
M TB TUBERC IFN-G BLD QL: NEGATIVE
M TB TUBERC IFN-G/MITOGEN IGNF BLD: -0
M TB TUBERC IGNF/MITOGEN IGNF CONTROL: 35.55 [IU]/ML
MITOGEN IGNF BCKGRD COR BLD-ACNC: 0.03 [IU]/ML

## 2018-09-12 ENCOUNTER — NON-PROVIDER VISIT (OUTPATIENT)
Dept: MEDICAL GROUP | Facility: LAB | Age: 38
End: 2018-09-12
Payer: COMMERCIAL

## 2018-09-12 DIAGNOSIS — Z23 NEED FOR VACCINATION: ICD-10-CM

## 2018-09-12 PROCEDURE — 90471 IMMUNIZATION ADMIN: CPT | Performed by: FAMILY MEDICINE

## 2018-09-12 PROCEDURE — 90686 IIV4 VACC NO PRSV 0.5 ML IM: CPT | Performed by: FAMILY MEDICINE

## 2018-09-12 NOTE — PROGRESS NOTES
"Amber Casey is a 38 y.o. female here for a non-provider visit for:   FLU    Reason for immunization: Annual Flu Vaccine  Immunization records indicate need for vaccine: Yes, confirmed with Epic  Minimum interval has been met for this vaccine: Yes  ABN completed: Yes    Order and dose verified by: Dr. Head  VIS Dated  8-7-15 was given to patient: Yes  All IAC Questionnaire questions were answered \"No.\"    Patient tolerated injection and no adverse effects were observed or reported: Yes    Pt scheduled for next dose in series: Not Indicated    "

## 2018-12-13 ENCOUNTER — HOSPITAL ENCOUNTER (OUTPATIENT)
Dept: LAB | Facility: MEDICAL CENTER | Age: 38
End: 2018-12-13
Attending: NURSE PRACTITIONER
Payer: COMMERCIAL

## 2018-12-13 PROCEDURE — 87591 N.GONORRHOEAE DNA AMP PROB: CPT

## 2018-12-13 PROCEDURE — 87491 CHLMYD TRACH DNA AMP PROBE: CPT

## 2018-12-13 PROCEDURE — 87624 HPV HI-RISK TYP POOLED RSLT: CPT

## 2018-12-13 PROCEDURE — 88175 CYTOPATH C/V AUTO FLUID REDO: CPT

## 2018-12-17 LAB
C TRACH DNA GENITAL QL NAA+PROBE: NEGATIVE
CYTOLOGY REG CYTOL: NORMAL
HPV HR 12 DNA CVX QL NAA+PROBE: NEGATIVE
HPV16 DNA SPEC QL NAA+PROBE: NEGATIVE
HPV18 DNA SPEC QL NAA+PROBE: NEGATIVE
N GONORRHOEA DNA GENITAL QL NAA+PROBE: NEGATIVE
SPECIMEN SOURCE: NORMAL
SPECIMEN SOURCE: NORMAL

## 2019-01-30 ENCOUNTER — OFFICE VISIT (OUTPATIENT)
Dept: MEDICAL GROUP | Facility: LAB | Age: 39
End: 2019-01-30
Payer: COMMERCIAL

## 2019-01-30 DIAGNOSIS — J40 BRONCHITIS: ICD-10-CM

## 2019-01-30 DIAGNOSIS — J01.10 ACUTE NON-RECURRENT FRONTAL SINUSITIS: ICD-10-CM

## 2019-01-30 PROCEDURE — 99214 OFFICE O/P EST MOD 30 MIN: CPT | Performed by: FAMILY MEDICINE

## 2019-01-30 RX ORDER — AMOXICILLIN AND CLAVULANATE POTASSIUM 875; 125 MG/1; MG/1
1 TABLET, FILM COATED ORAL 2 TIMES DAILY
Qty: 14 TAB | Refills: 0 | Status: SHIPPED | OUTPATIENT
Start: 2019-01-30 | End: 2019-02-06

## 2019-01-31 NOTE — PROGRESS NOTES
Subjective:   Amber Casey is a 38 y.o. female here today for headache, sinus congestion    1. Bronchitis  2. Acute non-recurrent frontal sinusitis  New to discuss with me.  Patient reports sinus congestion, headache, rhinorrhea, postnasal drip for the last week.  Her son is also sick.  She is having chills, unsure is having fevers.  No abdominal pain.  Slight cough.  She reports ear pain.  She has been taking over-the-counter medications with no significant improvement.        Current medicines (including changes today)  Current Outpatient Prescriptions   Medication Sig Dispense Refill   • amoxicillin-clavulanate (AUGMENTIN) 875-125 MG Tab Take 1 Tab by mouth 2 times a day for 7 days. 14 Tab 0   • rizatriptan (MAXALT) 10 MG tablet TAKE 1 TABLET BY MOUTH ONCE AS NEEDED FOR MIGRAINE. 10 Tab 5   • FLUoxetine (PROZAC) 20 MG Cap TAKE 1 CAPSULE BY MOUTH EVERY DAY. 90 Cap 3   • clindamycin-tretinoin (VELTIN) gel Apply  to affected area(s) every evening.       No current facility-administered medications for this visit.      She  has a past medical history of Anxiety; Depression; and Migraine.    ROS   No fevers  No bowel changes  No LE edema       Objective:       Physical Exam: No vitals collected  Constitutional: Alert, no distress.  Skin: Warm, dry, good turgor, no rashes in visible areas.  Eye: Equal, round and reactive, conjunctiva clear, lids normal.  ENMT: Lips without lesions, good dentition, oropharynx erythematous without exudate.  Tympanic membrane pearly gray bilaterally  Neck: Trachea midline, no masses, no thyromegaly.  Positive cervical  lymphadenopathy  Respiratory: Unlabored respiratory effort, lungs clear to auscultation, no wheezes, no ronchi.  Cardiovascular: Normal S1, S2, RRR, no murmur, no edema.  Abdomen: Soft, non-tender, no masses, no hepatosplenomegaly.  Psych: Alert and oriented x3, normal affect and mood.      Assessment and Plan:   The following treatment plan was discussed    1.  Bronchitis  2. Acute non-recurrent frontal sinusitis  New, unstable.  Suspect sinusitis.  We discussed potential viral cause but given that she will be going on vacation in the next few days, we are going to start Augmentin for treatment.  Return precautions given.      Followup: Return if symptoms worsen or fail to improve.       This note was created using voice recognition software. I have made every reasonable attempt to correct errors, however, I do anticipate some grammatical errors.

## 2019-02-20 DIAGNOSIS — Z20.828 EXPOSURE TO INFLUENZA: ICD-10-CM

## 2019-02-20 RX ORDER — OSELTAMIVIR PHOSPHATE 75 MG/1
75 CAPSULE ORAL DAILY
Qty: 10 CAP | Refills: 0 | Status: SHIPPED | OUTPATIENT
Start: 2019-02-20 | End: 2019-03-02

## 2019-03-24 DIAGNOSIS — F32.1 MODERATE SINGLE CURRENT EPISODE OF MAJOR DEPRESSIVE DISORDER (HCC): ICD-10-CM

## 2019-03-25 RX ORDER — FLUOXETINE HYDROCHLORIDE 20 MG/1
CAPSULE ORAL
Qty: 90 CAP | Refills: 3 | Status: SHIPPED | OUTPATIENT
Start: 2019-03-25 | End: 2020-03-17

## 2019-05-28 ENCOUNTER — TELEPHONE (OUTPATIENT)
Dept: URGENT CARE | Facility: CLINIC | Age: 39
End: 2019-05-28

## 2019-05-28 NOTE — TELEPHONE ENCOUNTER
Spoke to patient about previous questions asked. Per the provider pt needs to be seen in order to get diagnosed and treated properly. I clarified with the patient that she needs to be seen by a provider at an urgent care, the provider does not prescribe medication for symptoms over the phone. She acknowledged that she understood.

## 2019-05-28 NOTE — TELEPHONE ENCOUNTER
PT called looking for the provider whom saw her son previously for possible strep. pt states to have woken up with similar symptoms son was treated for. She has her PCP which is out on maternity leave. Pt stated if it is possible for provider whom saw her son to prescribe her amoxicillin to get rid of her symptoms. I stated I would get back to her when the provider is available and give her the proper answer she needs.

## 2019-08-16 DIAGNOSIS — G43.109 MIGRAINE WITH AURA AND WITHOUT STATUS MIGRAINOSUS, NOT INTRACTABLE: ICD-10-CM

## 2019-08-19 RX ORDER — RIZATRIPTAN BENZOATE 10 MG/1
TABLET ORAL
Qty: 10 TAB | Refills: 5 | Status: SHIPPED | OUTPATIENT
Start: 2019-08-19 | End: 2021-06-25 | Stop reason: SDUPTHER

## 2019-11-13 ENCOUNTER — OFFICE VISIT (OUTPATIENT)
Dept: URGENT CARE | Facility: MEDICAL CENTER | Age: 39
End: 2019-11-13
Payer: COMMERCIAL

## 2019-11-13 VITALS
DIASTOLIC BLOOD PRESSURE: 66 MMHG | BODY MASS INDEX: 26.84 KG/M2 | TEMPERATURE: 98.1 F | HEIGHT: 66 IN | HEART RATE: 133 BPM | WEIGHT: 167 LBS | OXYGEN SATURATION: 96 % | SYSTOLIC BLOOD PRESSURE: 100 MMHG

## 2019-11-13 DIAGNOSIS — J11.1 INFLUENZA-LIKE ILLNESS: ICD-10-CM

## 2019-11-13 LAB
FLUAV+FLUBV AG SPEC QL IA: NEGATIVE
INT CON NEG: NORMAL
INT CON POS: NORMAL

## 2019-11-13 PROCEDURE — 87804 INFLUENZA ASSAY W/OPTIC: CPT | Performed by: PHYSICIAN ASSISTANT

## 2019-11-13 PROCEDURE — 99214 OFFICE O/P EST MOD 30 MIN: CPT | Performed by: PHYSICIAN ASSISTANT

## 2019-11-13 RX ORDER — OSELTAMIVIR PHOSPHATE 75 MG/1
75 CAPSULE ORAL 2 TIMES DAILY
Qty: 10 CAP | Refills: 0 | Status: SHIPPED | OUTPATIENT
Start: 2019-11-13 | End: 2019-11-18

## 2019-11-13 ASSESSMENT — ENCOUNTER SYMPTOMS
FEVER: 1
SORE THROAT: 0
ABDOMINAL PAIN: 0
COUGH: 1

## 2019-11-13 NOTE — PROGRESS NOTES
"Subjective:      Amber Casey is a 39 y.o. female who presents with Fever and Influenza (sx)            Fever    This is a new problem. The problem occurs intermittently. The problem has been waxing and waning. The maximum temperature noted was 101 to 101.9 F. The temperature was taken using an axillary reading. Associated symptoms include congestion, coughing and muscle aches. Pertinent negatives include no abdominal pain, chest pain or sore throat. She has tried NSAIDs for the symptoms. The treatment provided mild relief.   Risk factors: no contaminated food and no contaminated water    Influenza   Associated symptoms include congestion, coughing and a fever. Pertinent negatives include no abdominal pain, chest pain or sore throat.       Past medical history: Is not pertinent to this examination  Past surgical history: Not pertinent to this examination  Family history: Is not pertinent to this examination  Allergies: No known drug allergies  Social history: Is reviewed in [facility]  Current Outpatient Medications on File Prior to Visit   Medication Sig Dispense Refill   • rizatriptan (MAXALT) 10 MG tablet TAKE 1 TABLET BY MOUTH ONCE AS NEEDED FOR MIGRAINE. 10 Tab 5   • FLUoxetine (PROZAC) 20 MG Cap TAKE 1 CAPSULE BY MOUTH EVERY DAY. 90 Cap 3   • clindamycin-tretinoin (VELTIN) gel Apply  to affected area(s) every evening.       No current facility-administered medications on file prior to visit.          Review of Systems   Constitutional: Positive for fever.   HENT: Positive for congestion. Negative for sore throat.    Respiratory: Positive for cough.    Cardiovascular: Negative for chest pain.   Gastrointestinal: Negative for abdominal pain.          Objective:     /66 (BP Location: Left arm, Patient Position: Sitting)   Pulse (!) 133   Temp 36.7 °C (98.1 °F) (Temporal)   Ht 1.676 m (5' 6\")   Wt 75.8 kg (167 lb)   SpO2 96%   BMI 26.95 kg/m²      Physical Exam          Gen.: Patient is A and O ×3, " no acute distress, well-nourished well-hydrated  Vitals: Are listed and unremarkable  HEENT: Heads normocephalic, atraumatic, PERRLA, extraocular movements intact, TMs and oropharynx clear  Neck: Soft supple without cervical lymphadenopathy  Cardiovascular: Regular rate and rhythm normal S1 and S2. No murmurs, rubs or gallops  Lungs are clear to auscultation bilaterally. no wheezes rales or rhonchi  Abdomen is soft, nontender, nondistended with good bowel sounds, no hepatosplenomegaly  Skin: Is well perfused without evidence of rash or lesions  Neurological:  cranial nerves II through XII were assessed and intact.  Musculoskeletal: Full range of motion, 5 out of 5 strength against resistance  Neurovascularly: Intact with a 2 second cap refill, good distal pulses    Urgent care course rapid flu was negative  Assessment/Plan:       1. Influenza-like illness  Patient's 12-year-old son is here and tested positive for flu B.  Patient requesting Tamiflu.  Reasonable given her symptoms and exposure.  Take meds as directed.  24-hour history thus far.  Follow-up as needed  - oseltamivir (TAMIFLU) 75 MG Cap; Take 1 Cap by mouth 2 times a day for 5 days.  Dispense: 10 Cap; Refill: 0

## 2019-12-12 ENCOUNTER — NON-PROVIDER VISIT (OUTPATIENT)
Dept: MEDICAL GROUP | Facility: LAB | Age: 39
End: 2019-12-12
Payer: COMMERCIAL

## 2019-12-12 ENCOUNTER — TELEPHONE (OUTPATIENT)
Dept: MEDICAL GROUP | Facility: LAB | Age: 39
End: 2019-12-12

## 2019-12-12 DIAGNOSIS — Z23 NEED FOR VACCINATION: ICD-10-CM

## 2019-12-12 PROCEDURE — 90686 IIV4 VACC NO PRSV 0.5 ML IM: CPT | Performed by: NURSE PRACTITIONER

## 2019-12-12 PROCEDURE — 90471 IMMUNIZATION ADMIN: CPT | Performed by: NURSE PRACTITIONER

## 2019-12-12 NOTE — PROGRESS NOTES
"Amber Casey is a 39 y.o. female here for a non-provider visit for:   FLU    Reason for immunization: Annual Flu Vaccine  Immunization records indicate need for vaccine: Yes, confirmed with Epic  Minimum interval has been met for this vaccine: Yes  ABN completed: Not Indicated    Order and dose verified by: BHUMIKA  VIS Dated  8/15/19 was given to patient: Yes  All IAC Questionnaire questions were answered \"No.\"    Patient tolerated injection and no adverse effects were observed or reported: Yes    Pt scheduled for next dose in series: Not Indicated  "

## 2020-03-20 DIAGNOSIS — F32.1 MODERATE SINGLE CURRENT EPISODE OF MAJOR DEPRESSIVE DISORDER (HCC): ICD-10-CM

## 2020-03-20 RX ORDER — FLUOXETINE HYDROCHLORIDE 20 MG/1
20 CAPSULE ORAL
Qty: 90 CAP | Refills: 0 | Status: SHIPPED | OUTPATIENT
Start: 2020-03-20 | End: 2020-06-24 | Stop reason: SDUPTHER

## 2020-06-24 ENCOUNTER — OFFICE VISIT (OUTPATIENT)
Dept: MEDICAL GROUP | Facility: LAB | Age: 40
End: 2020-06-24
Payer: COMMERCIAL

## 2020-06-24 VITALS
OXYGEN SATURATION: 98 % | BODY MASS INDEX: 26.36 KG/M2 | SYSTOLIC BLOOD PRESSURE: 116 MMHG | HEIGHT: 66 IN | TEMPERATURE: 97.9 F | WEIGHT: 164 LBS | RESPIRATION RATE: 12 BRPM | DIASTOLIC BLOOD PRESSURE: 78 MMHG | HEART RATE: 86 BPM

## 2020-06-24 DIAGNOSIS — Z00.00 WELL ADULT EXAM: ICD-10-CM

## 2020-06-24 DIAGNOSIS — G43.109 MIGRAINE WITH AURA AND WITHOUT STATUS MIGRAINOSUS, NOT INTRACTABLE: ICD-10-CM

## 2020-06-24 DIAGNOSIS — Z12.31 ENCOUNTER FOR SCREENING MAMMOGRAM FOR BREAST CANCER: ICD-10-CM

## 2020-06-24 DIAGNOSIS — F32.1 MODERATE SINGLE CURRENT EPISODE OF MAJOR DEPRESSIVE DISORDER (HCC): ICD-10-CM

## 2020-06-24 PROCEDURE — 99214 OFFICE O/P EST MOD 30 MIN: CPT | Performed by: FAMILY MEDICINE

## 2020-06-24 RX ORDER — FLUOXETINE HYDROCHLORIDE 40 MG/1
40 CAPSULE ORAL DAILY
Qty: 90 CAP | Refills: 3 | Status: SHIPPED | OUTPATIENT
Start: 2020-06-24 | End: 2021-05-06

## 2020-06-24 ASSESSMENT — ENCOUNTER SYMPTOMS
SORE THROAT: 0
CHILLS: 0
COUGH: 0
FEVER: 0
SHORTNESS OF BREATH: 0

## 2020-06-24 ASSESSMENT — FIBROSIS 4 INDEX: FIB4 SCORE: 0.72

## 2020-06-24 NOTE — PROGRESS NOTES
"Amber Casey is a 40 y.o. female here to establish care and discuss medication refill.    HPI:      Depression  Chronic issue, had been well controlled with Prozac.  However, she has had increasing stress lately with COVID pandemic and having 3 kids at home. Would like to go up on Prozac, has been on this for years.    Migraines   Rare, happen less than monthly- maxalt helps.    Current medicines (including changes today)  Current Outpatient Medications   Medication Sig Dispense Refill   • FLUoxetine (PROZAC) 20 MG Cap Take 1 Cap by mouth every day. 90 Cap 0   • rizatriptan (MAXALT) 10 MG tablet TAKE 1 TABLET BY MOUTH ONCE AS NEEDED FOR MIGRAINE. 10 Tab 5     No current facility-administered medications for this visit.      She  has a past medical history of Anxiety, Depression, and Migraine.  She  has a past surgical history that includes tonsillectomy.  Social History     Tobacco Use   • Smoking status: Never Smoker   • Smokeless tobacco: Never Used   Substance Use Topics   • Alcohol use: Yes     Comment: Occ   • Drug use: No     Social History     Social History Narrative   • Not on file     Family History   Problem Relation Age of Onset   • Psychiatric Illness Mother    • Hypertension Mother    • Hyperlipidemia Mother    • Hypertension Father    • Hyperlipidemia Father    • Diabetes Paternal Grandmother      Family Status   Relation Name Status   • Mo  Alive   • Fa  Alive   • PGMo  (Not Specified)       ROS  Review of Systems   Constitutional: Negative for chills and fever.   HENT: Negative for congestion and sore throat.    Respiratory: Negative for cough and shortness of breath.    Cardiovascular: Negative for chest pain.         Objective:     Physical Exam:  /78 (BP Location: Left arm, Patient Position: Sitting, BP Cuff Size: Adult)   Pulse 86   Temp 36.6 °C (97.9 °F)   Resp 12   Ht 1.676 m (5' 6\")   Wt 74.4 kg (164 lb)   SpO2 98%  Body mass index is 26.47 kg/m².  Constitutional: Alert. " Well appearing. No distress.  Skin: Warm, dry, good turgor, no visible rashes.  Eye: Equal, round and reactive to light, conjunctiva clear, lids normal.  ENMT: Moist mucous membranes.  Neck: Trachea midline, no masses, no thyromegaly.  Respiratory: Normal effort.   Neuro: Moves all four extremities. No facial droop.  Psych: Answers questions appropriately. Normal affect and mood.    Assessment and Plan:     1. Moderate single current episode of major depressive disorder (HCC)  Chronic issue.  Has been on Prozac for years and this is working well for her but she would like to increase dose due to increased life stressors.  Dose is increased to 40 mg daily.  Plans to reestablish with another provider at this office and she will schedule follow-up.  - FLUoxetine (PROZAC) 40 MG capsule; Take 1 Cap by mouth every day.  Dispense: 90 Cap; Refill: 3    2. Migraine with aura and without status migrainosus, not intractable  Chronic issue.  Stable and has migraines less than monthly.  Maxalt works well.    3. Well adult exam  Health maintenance reviewed and updated.  She is up-to-date on vaccinations and Pap.  Mammogram as below.  Labs as below.  - CBC WITH DIFFERENTIAL; Future  - Comp Metabolic Panel; Future  - Lipid Profile; Future    4. Encounter for screening mammogram for breast cancer  - MA-SCREENING MAMMO BILAT W/TOMOSYNTHESIS W/CAD; Future    Follow up: Return if symptoms worsen or fail to improve.         PLEASE NOTE: This note was created using voice recognition software.

## 2020-07-24 ENCOUNTER — OFFICE VISIT (OUTPATIENT)
Dept: MEDICAL GROUP | Facility: LAB | Age: 40
End: 2020-07-24
Payer: COMMERCIAL

## 2020-07-24 ENCOUNTER — HOSPITAL ENCOUNTER (OUTPATIENT)
Dept: LAB | Facility: MEDICAL CENTER | Age: 40
End: 2020-07-24
Attending: FAMILY MEDICINE
Payer: COMMERCIAL

## 2020-07-24 VITALS
WEIGHT: 166 LBS | DIASTOLIC BLOOD PRESSURE: 80 MMHG | OXYGEN SATURATION: 96 % | RESPIRATION RATE: 12 BRPM | HEART RATE: 84 BPM | HEIGHT: 66 IN | SYSTOLIC BLOOD PRESSURE: 112 MMHG | BODY MASS INDEX: 26.68 KG/M2 | TEMPERATURE: 98.3 F

## 2020-07-24 DIAGNOSIS — F32.1 MODERATE SINGLE CURRENT EPISODE OF MAJOR DEPRESSIVE DISORDER (HCC): ICD-10-CM

## 2020-07-24 DIAGNOSIS — Z00.00 WELL ADULT EXAM: ICD-10-CM

## 2020-07-24 LAB
ALBUMIN SERPL BCP-MCNC: 4.7 G/DL (ref 3.2–4.9)
ALBUMIN/GLOB SERPL: 2 G/DL
ALP SERPL-CCNC: 41 U/L (ref 30–99)
ALT SERPL-CCNC: 18 U/L (ref 2–50)
ANION GAP SERPL CALC-SCNC: 15 MMOL/L (ref 7–16)
AST SERPL-CCNC: 16 U/L (ref 12–45)
BASOPHILS # BLD AUTO: 0.9 % (ref 0–1.8)
BASOPHILS # BLD: 0.05 K/UL (ref 0–0.12)
BILIRUB SERPL-MCNC: 0.6 MG/DL (ref 0.1–1.5)
BUN SERPL-MCNC: 17 MG/DL (ref 8–22)
CALCIUM SERPL-MCNC: 9.1 MG/DL (ref 8.5–10.5)
CHLORIDE SERPL-SCNC: 102 MMOL/L (ref 96–112)
CHOLEST SERPL-MCNC: 211 MG/DL (ref 100–199)
CO2 SERPL-SCNC: 22 MMOL/L (ref 20–33)
CREAT SERPL-MCNC: 0.55 MG/DL (ref 0.5–1.4)
EOSINOPHIL # BLD AUTO: 0.29 K/UL (ref 0–0.51)
EOSINOPHIL NFR BLD: 5 % (ref 0–6.9)
ERYTHROCYTE [DISTWIDTH] IN BLOOD BY AUTOMATED COUNT: 41.8 FL (ref 35.9–50)
FASTING STATUS PATIENT QL REPORTED: NORMAL
GLOBULIN SER CALC-MCNC: 2.4 G/DL (ref 1.9–3.5)
GLUCOSE SERPL-MCNC: 81 MG/DL (ref 65–99)
HCT VFR BLD AUTO: 44.8 % (ref 37–47)
HDLC SERPL-MCNC: 44 MG/DL
HGB BLD-MCNC: 14.9 G/DL (ref 12–16)
IMM GRANULOCYTES # BLD AUTO: 0.01 K/UL (ref 0–0.11)
IMM GRANULOCYTES NFR BLD AUTO: 0.2 % (ref 0–0.9)
LDLC SERPL CALC-MCNC: 145 MG/DL
LYMPHOCYTES # BLD AUTO: 1.6 K/UL (ref 1–4.8)
LYMPHOCYTES NFR BLD: 27.4 % (ref 22–41)
MCH RBC QN AUTO: 31.6 PG (ref 27–33)
MCHC RBC AUTO-ENTMCNC: 33.3 G/DL (ref 33.6–35)
MCV RBC AUTO: 94.9 FL (ref 81.4–97.8)
MONOCYTES # BLD AUTO: 0.47 K/UL (ref 0–0.85)
MONOCYTES NFR BLD AUTO: 8 % (ref 0–13.4)
NEUTROPHILS # BLD AUTO: 3.43 K/UL (ref 2–7.15)
NEUTROPHILS NFR BLD: 58.5 % (ref 44–72)
NRBC # BLD AUTO: 0 K/UL
NRBC BLD-RTO: 0 /100 WBC
PLATELET # BLD AUTO: 276 K/UL (ref 164–446)
PMV BLD AUTO: 9.7 FL (ref 9–12.9)
POTASSIUM SERPL-SCNC: 4.1 MMOL/L (ref 3.6–5.5)
PROT SERPL-MCNC: 7.1 G/DL (ref 6–8.2)
RBC # BLD AUTO: 4.72 M/UL (ref 4.2–5.4)
SODIUM SERPL-SCNC: 139 MMOL/L (ref 135–145)
TRIGL SERPL-MCNC: 111 MG/DL (ref 0–149)
TSH SERPL DL<=0.005 MIU/L-ACNC: 1.37 UIU/ML (ref 0.38–5.33)
WBC # BLD AUTO: 5.9 K/UL (ref 4.8–10.8)

## 2020-07-24 PROCEDURE — 80061 LIPID PANEL: CPT

## 2020-07-24 PROCEDURE — 85025 COMPLETE CBC W/AUTO DIFF WBC: CPT

## 2020-07-24 PROCEDURE — 36415 COLL VENOUS BLD VENIPUNCTURE: CPT

## 2020-07-24 PROCEDURE — 80053 COMPREHEN METABOLIC PANEL: CPT

## 2020-07-24 PROCEDURE — 99213 OFFICE O/P EST LOW 20 MIN: CPT | Performed by: FAMILY MEDICINE

## 2020-07-24 PROCEDURE — 84443 ASSAY THYROID STIM HORMONE: CPT

## 2020-07-24 RX ORDER — ASCORBIC ACID 500 MG
500 TABLET ORAL DAILY
COMMUNITY
End: 2021-10-31

## 2020-07-24 ASSESSMENT — ENCOUNTER SYMPTOMS
CHILLS: 0
WHEEZING: 0
FEVER: 0
SORE THROAT: 0
COUGH: 0
SHORTNESS OF BREATH: 0

## 2020-07-24 ASSESSMENT — FIBROSIS 4 INDEX: FIB4 SCORE: 0.72

## 2020-07-24 NOTE — PROGRESS NOTES
"Subjective:     CC: Follow up depression    HPI:   Amber presents today to follow up on:    Depression  Prozac increase one month ago and has helped with mood  Some afternoon fatigue, sleeping well.  Still tough with 3 kids at home and not going to school.    Health Maintenance: Completed    Medications, past medical history, allergies, and social history have been reviewed and updated.    ROS:  Review of Systems   Constitutional: Negative for chills and fever.   HENT: Negative for congestion and sore throat.    Respiratory: Negative for cough, shortness of breath and wheezing.    Cardiovascular: Negative for chest pain.        Objective:       Exam:  /80 (BP Location: Right arm, Patient Position: Sitting, BP Cuff Size: Adult)   Pulse 84   Temp 36.8 °C (98.3 °F)   Resp 12   Ht 1.676 m (5' 6\")   Wt 75.3 kg (166 lb)   SpO2 96%   BMI 26.79 kg/m²  Body mass index is 26.79 kg/m².    Constitutional: Alert. Well appearing. No distress.  Skin: Warm, dry, good turgor, no visible rashes.  Eye: Equal, round and reactive to light, conjunctiva clear, lids normal.  ENMT: Moist mucous membranes.   Respiratory: Normal effort.  Neuro: Moves all four extremities. No facial droop.  Psych: Answers questions appropriately. Normal affect and mood.    Assessment & Plan:     40 y.o. female with the following -     1. Moderate single current episode of major depressive disorder (HCC)  Chronic issue, mood improved with increase of Prozac last month.  She is having some afternoon fatigue and will check thyroid as below.  Follow-up as needed.  - TSH WITH REFLEX TO FT4; Future    Please note that this note was created using voice recognition software.      "

## 2020-07-29 ENCOUNTER — PATIENT MESSAGE (OUTPATIENT)
Dept: MEDICAL GROUP | Facility: LAB | Age: 40
End: 2020-07-29

## 2020-07-29 DIAGNOSIS — E66.3 OVERWEIGHT WITH BODY MASS INDEX (BMI) OF 26 TO 26.9 IN ADULT: ICD-10-CM

## 2020-07-29 DIAGNOSIS — Z01.84 IMMUNITY STATUS TESTING: ICD-10-CM

## 2020-08-26 ENCOUNTER — HOSPITAL ENCOUNTER (OUTPATIENT)
Dept: LAB | Facility: MEDICAL CENTER | Age: 40
End: 2020-08-26
Attending: FAMILY MEDICINE
Payer: COMMERCIAL

## 2020-08-26 DIAGNOSIS — Z01.84 IMMUNITY STATUS TESTING: ICD-10-CM

## 2020-08-26 PROCEDURE — 86765 RUBEOLA ANTIBODY: CPT

## 2020-08-26 PROCEDURE — 86735 MUMPS ANTIBODY: CPT

## 2020-08-26 PROCEDURE — 36415 COLL VENOUS BLD VENIPUNCTURE: CPT

## 2020-08-26 PROCEDURE — 86762 RUBELLA ANTIBODY: CPT

## 2020-08-27 LAB
MEV IGG SER IA-ACNC: 0.89
MUV IGG SER IA-ACNC: 0.69
RUBV AB SER QL: 59.6 IU/ML

## 2020-09-16 ENCOUNTER — OFFICE VISIT (OUTPATIENT)
Dept: HEALTH INFORMATION MANAGEMENT | Facility: MEDICAL CENTER | Age: 40
End: 2020-09-16
Payer: COMMERCIAL

## 2020-09-16 VITALS
OXYGEN SATURATION: 99 % | DIASTOLIC BLOOD PRESSURE: 78 MMHG | BODY MASS INDEX: 27.4 KG/M2 | HEART RATE: 68 BPM | WEIGHT: 170.5 LBS | SYSTOLIC BLOOD PRESSURE: 122 MMHG | HEIGHT: 66 IN

## 2020-09-16 DIAGNOSIS — E55.9 VITAMIN D DEFICIENCY: ICD-10-CM

## 2020-09-16 DIAGNOSIS — E66.3 OVERWEIGHT: ICD-10-CM

## 2020-09-16 DIAGNOSIS — F32.1 MODERATE SINGLE CURRENT EPISODE OF MAJOR DEPRESSIVE DISORDER (HCC): ICD-10-CM

## 2020-09-16 DIAGNOSIS — R63.5 WEIGHT GAIN: ICD-10-CM

## 2020-09-16 DIAGNOSIS — R63.2 BINGE EATING: ICD-10-CM

## 2020-09-16 DIAGNOSIS — E78.00 PURE HYPERCHOLESTEROLEMIA: ICD-10-CM

## 2020-09-16 PROCEDURE — 93000 ELECTROCARDIOGRAM COMPLETE: CPT | Performed by: INTERNAL MEDICINE

## 2020-09-16 PROCEDURE — 99205 OFFICE O/P NEW HI 60 MIN: CPT | Performed by: INTERNAL MEDICINE

## 2020-09-16 PROCEDURE — 97802 MEDICAL NUTRITION INDIV IN: CPT | Performed by: DIETITIAN, REGISTERED

## 2020-09-16 RX ORDER — TOPIRAMATE 25 MG/1
25 TABLET ORAL DAILY
Qty: 30 TAB | Refills: 1 | Status: SHIPPED | OUTPATIENT
Start: 2020-09-16 | End: 2020-10-10

## 2020-09-16 ASSESSMENT — FIBROSIS 4 INDEX: FIB4 SCORE: 0.55

## 2020-09-16 ASSESSMENT — PATIENT HEALTH QUESTIONNAIRE - PHQ9: CLINICAL INTERPRETATION OF PHQ2 SCORE: 0

## 2020-09-16 NOTE — PROGRESS NOTES
"Bariatric Medicine H&P  Chief Complaint   Patient presents with   • Weight Gain       Referred by:  Joel Wright*    History of Present Illness  Amber Casey is a 40 y.o.  female who presents for weight management and to help address co-morbidities caused by overweight, as below.    Pt is concerned about gradual wt gain, cannot reverse it.  She has tried WW, DASH diet, did well until 6 mos again and gained after COVID restrictions in place.    H/o Antiobesity medications: None  H/o Bariatric Surgery: None    Brief Diet History (see also RD notes):  AM: Premier protein or cereal  Lunch/Dinner: Skips lunch, protein/vegetables/carb for dinner  Snacks: Snack bar, peanut butter, toast  Drinks: Coffee, water, orange juice    Medical Dx:  Sleep apnea screen: Negative  HLD: Controlled, not on statin or fenofibrate  VDD: Controlled, not on vitamin D supplement  Depression: Controlled on Prozac    Behavior-Related History  Binge eating screen: Positive  H/o abuse: Negative     Exercise:   Hiking 2 times per month  Daily dog walking  10,000 steps per day     Review of Systems   Positive for anxiety, depression.  Denies significant fatigue, back pain, urinary incontinence, nausea vomiting or diarrhea  All other ROS were reviewed with patient today and are negative.      PMH/PSH:  I have reviewed the patient's medical, social and family history, allergies, and medications today.  Prior records reviewed.  Stay-at-home mom, was working in a     Physical Exam:   /78 (BP Location: Left arm, Patient Position: Sitting, BP Cuff Size: Large adult)   Pulse 68   Ht 1.676 m (5' 6\")   Wt 77.3 kg (170 lb 8 oz)   SpO2 99%   BMI 27.52 kg/m²   Waist Measurement   Waist: 35.25 inch/inches  Body fat % & REE:  see accompanying flow sheet    Constitutional: Oriented to person, place, and time and well-developed, well-nourished, and in no distress.    HENT: No facial plethora.  No Cushingoid " features.  No scalloped tongue.  No dental erosions.  No swollen parotids.  Head: Normocephalic.   Eyes: EOM are normal. Pupils are equal, round, and reactive to light. No periorbital edema.  No lateral thinning of eyebrows.  No vertical nystagmus.  Neck: Normal range of motion. Neck supple. No thyromegaly present. No buffalo hump.  Cardiovascular: Normal rate and regular rhythm.  No murmur heard.  Pulmonary/Chest: Effort normal and breath sounds normal. No wheezes.   Abdominal: Soft. Bowel sounds are normal. No pannus.  No ascites.  No hepatosplenomegaly.   Musculoskeletal: Normal range of motion. No edema.   Neurological: Alert and oriented to person, place, and time. Normal reflexes. No cranial nerve deficit. No muscle weakness.  Gait normal.   Skin: Warm and dry. Not diaphoretic. No hirsuitism.  No acanthosis nigricans.  Not excessively dry, scaly.  No acne.  No bruising/ecchymosis.  No hyperpigmentation.  No xanthomas or acrochordon.    Psychiatric: Mood, memory, affect and judgment normal.  Tearful at times when talking about having to quit her job to stay home to care for her kids.    Laboratory:   Prior labs reviewed.  EKG: Sinus rhythm, rate 61, no ST elevations or depressions.  Corrected QT 0.406  Ordered, performed in our office today, and reviewed by me today.    Dietitian Assessment: I have reviewed the Dietitian's assessment related to this encounter.       ASSESSMENT  40 y.o. female presents for intensive lifestyle intervention for treatment of obesity and co-morbid conditions.  Obesity Stage ([place])/Class 1/overweight    1. Weight gain  REFERRAL TO BEHAVIORAL HEALTH   2. Pure hypercholesterolemia     3. Vitamin D deficiency     4. Moderate single current episode of major depressive disorder (HCC)  REFERRAL TO BEHAVIORAL HEALTH   5. Overweight  REFERRAL TO BEHAVIORAL HEALTH   6. Binge eating  REFERRAL TO BEHAVIORAL HEALTH       Patient has some emotional distress, stress eating, depressive  symptoms, that should be addressed by behavioral health.  She is amenable to this.  She may benefit from change in her antidepressant medication, that may help with weight loss as well.  In addition, start tracking intake, consider additional appetite suppressant.  Continue to monitor lipids, vitamin D as she progresses through the program.    PLAN  Weight Goal: 140 lb  Dietary intervention:     MR:  protein every morning  High Protein/Low Carb whole food meals and 2 snacks between meals daily  >100 g protein, <100 g total carbs daily, 1200 kcal/d initial goal  Track daily intake with My Fitness Pal, bring to next visit  64+ oz water per day  Physical Activity: 1000 steps per day, dog walking  Labs: N/A  Diagnostics:  EKG  Rx changes:   Start Topamax for appetite suppression  Consider change from Prozac to Wellbutrin but patient to discuss with psychiatry  Reviewed the patient cannot get pregnant with Topamax, will cause birth defects  Patient with IUD and  has had vasectomy  Behavior change:   Behavioral health referral given  Surgical considerations: N/A  Follow-up: one month with MD, weekly to biweekly for preventive obesity counseling    Face to face time spent 60 minutes,  with >50% of time devoted to one on one counseling on weight management issues, as documented above.      Patient's body mass index is 27.52 kg/m². Exercise and nutrition counseling were performed at this visit.        Thank you for your referral!

## 2020-09-16 NOTE — PROGRESS NOTES
"9/16/2020     Amber Casey 40 y.o.        Time in/out: 1:05-1:35    Anthropometrics/Objective  Vitals:    09/16/20 1216   BP: 122/78   Weight: 77.3 kg (170 lb 8 oz)   Height: 1.676 m (5' 6\")     BMI: Body mass index is 27.52 kg/m².  Stated Goal Weight: 140 lbs  See comprehensive patient history form for further information     Subjective:  Pt is here today for the initial screening visit for the medical weight management program.     - was doing DASH diet in the recent past  - bouts of depression lately  - concerned about hypercholesterolemia  - finds she struggles with healthy breakfast options  - feels she eats well as a general  - will be tracking and trying PP shake for B    See HIP Medical Questionnaire in media for more detailed diet history     Drinks: water, milk (2%), coffee with 1 tbsp of sweetened creamer    Nutrition Diagnosis (PES Statement)  · Obesity related to excessive energy intake and inadequate energy expenditure as evidenced by BMI >30     Client history:  Condition(s) associated with a diagnosis or treatment / Pertinent Medical Hx: weight gain, pure hypercholesterolemia, Vit D def, depression    Biochemical data, medical test and procedures  No results found for: HBA1C  No results found for: POCGLUCOSE  Lab Results   Component Value Date/Time    CHOLSTRLTOT 211 (H) 07/24/2020 09:28 AM     (H) 07/24/2020 09:28 AM    HDL 44 07/24/2020 09:28 AM    TRIGLYCERIDE 111 07/24/2020 09:28 AM         Nutrition Intervention  Nutrition Prescription  Recommended Daily Kcals: 8470-9524 Kcal based on REE of 1474   Recommended Daily Protein: 100g or ~30% of kcals      Meal Plan Recommendation   Calorie controlled, high protein, low carb diet    with 1 meal replacements per day  1-2 snacks; 1 oz protein + non-starchy veggies or 1 fruit      Comprehensive Nutrition Education Instruction or training leading to in-depth nutrition related knowledge about:   Benefits to following meal plan, combine " carb, protein and fat at each meal, meal timing and spacing, portion control, sweets and alcohol in moderation.  Handouts provided regarding topics discussed:   - Meal Plan   - My Plate Planner    - Snack list with fruit   - Meal ideas   - MyFitnessPal How-To Guide    Monitoring & Evaluation Plan    Behavioral-Environmental:  Behavior: Keep a food journal and bring to next appointment   Physical activity: Did not discuss today . Pt instructed to avoid subtracting calories from physical activity on My Fitness Pal.     Food / Nutrient Intake:  Food intake: Follow meal plan as discussed. Avoid concentrated sweets and processed carbs. Use the plate method for portion control and macronutrient balance. Limit carbs/starch to up to 1 cup per meal. Snacks should be 1oz protein + ns veggies or fruit. Fruit as a snack once per day.     Fluid intake: Consume at least 64 oz water per day. Avoid all sweetened beverages. Limit coffee to 1 cup a day (ideally no cream or sugar). Limit or avoid alcohol as discussed with Dr. Jaime.     Physical Signs / Symptoms:  Weight loss towards BMI < 30   Weight change: loss of 1-2 lbs/week    Assessment Notes:  Today I oriented Amber to Dr. Jaime's Medical Weight Management program. Encouraged a higher protein, lower carbohydrate, and calorie controlled meal plan consisting of 3 meals and 1-2 snacks per day with optional use of meal replacements. Encouraged patient to track their food/beverage intake on My Fitness Pal or utilize a written food journal.  We discussed how to build protein into each meal and snack, incorporating 1/2 plate non-starchy vegetable twice daily, optional 1/2 cup of complex carbohydrate at meals if desired, and avoiding refined carbohydrates and simple sugars. Reviewed snack guidelines to include 1 oz protein and optional non-starchy vegetable or 1 serving of fruit.      Amber will be aiming for 1250-1350kcal/d.  The pt will also be looking at the  macronutrient feature and aiming for 100g or less of carbohydrate and 100g or more of protein per day per Dr. Jaime recommendations (or 30% or less of CHO and 30% or more of protein from calories).     Applauded Amber for not skipping meals however she finds she can make better B choices therefore she will try a shake in the meantime. She states portions seem to be the thing she can work on the most at this time. Recommend she track her intake and we will re-evaluate goals at next visit.  At next visit suggest reviewing tracking info.    F/U: 2 weeks RD, 4-6 weeks LISA/MD

## 2020-09-30 ENCOUNTER — APPOINTMENT (OUTPATIENT)
Dept: HEALTH INFORMATION MANAGEMENT | Facility: MEDICAL CENTER | Age: 40
End: 2020-09-30
Payer: COMMERCIAL

## 2020-10-01 ENCOUNTER — NON-PROVIDER VISIT (OUTPATIENT)
Dept: HEALTH INFORMATION MANAGEMENT | Facility: MEDICAL CENTER | Age: 40
End: 2020-10-01
Payer: COMMERCIAL

## 2020-10-01 DIAGNOSIS — E66.3 OVERWEIGHT WITH BODY MASS INDEX (BMI) OF 26 TO 26.9 IN ADULT: ICD-10-CM

## 2020-10-01 PROCEDURE — 97803 MED NUTRITION INDIV SUBSEQ: CPT | Mod: 95,CR | Performed by: DIETITIAN, REGISTERED

## 2020-10-01 NOTE — PROGRESS NOTES
This evaluation was conducted via Zoom using secure and encrypted videoconferencing technology. The patient was in a private location in the state of [place].    The patient's identity was confirmed and verbal consent was obtained for this virtual visit.      Nutrition Reassess: Medical Weight Management   Today's date: 10/1/2020  Referring Provider: No ref. provider found      Time: in/out 4:00-4:30  Visit#: 2    Subjective:  - REE 1474   - shake for Breakfast   - state she is doing well for the most part  - feels on edge  - not hungry but feels she is missing something   - state she has cut back greatly on sugars  - feels that is when she stuggles  - has been doing Rx bars   - states has a good snack  - found that she does not eat as many veggies as she thought  - works at  where they serve lunch   - whole grain pasta with peas and corn and fruit and veggies    Diet history:   Breakfast - protein shake PP with celery and coffee with a little creamer  Snack - SF pudding cup  Lunch - hummus peppers turkey pepporni slices, cheese and diet snapple  Snack - RX bar  Dinner - spinach, boneless chicken tenders, pizza and pesto sauce with fresh mozz    Anthropometrics/Objective  Today's weight:  There were no vitals filed for this visit.  BMI:  There is no height or weight on file to calculate BMI.  Starting weight/date 170.5 lbs (9/16/20)     Total weight change : NA lb            Meal Plan:   Calorie controlled, high protein, low carb diet    with 1 meal replacements per day    ReAssesment/Notes:    Amber has been doing well working towards her weight loss and overall health goals. She has been more mindful of her portions, finding some habits that may be contributing to slow weight gain such as just finishing the last bit of food from a meal rather than packing it up. We discussed packing the little bit of leftovers as a good snack for later. We discussed meal timing, plate and plate portions. Amber noticed she  does not eat the quantity of NS veggies she previously thought she did. She does a great job of 'hiding' them into her foods. Encouraged her to add Ns veggies to her snacks as a way to compensate. Discussed protein servings and ideal gauge for protein sources. Encouraged sherman to reach out with any questions. At next visit suggest reviewing nutrition basics.    Follow-up: 2 weeks

## 2020-10-08 NOTE — TELEPHONE ENCOUNTER
Received request via: Pharmacy    Was the patient seen in the last year in this department? Yes    Does the patient have an active prescription (recently filled or refills available) for medication(s) requested? No     LV: 9/16/2020  FV: 10/21/2020

## 2020-10-10 RX ORDER — TOPIRAMATE 25 MG/1
TABLET ORAL
Qty: 30 TAB | Refills: 1 | Status: SHIPPED | OUTPATIENT
Start: 2020-10-10 | End: 2020-10-20 | Stop reason: SDUPTHER

## 2020-10-12 NOTE — TELEPHONE ENCOUNTER
1. Caller Name: Amber                                         Call Back Number: [phone] (home)       Patient approves a detailed voicemail message: N\A    Patient is on the MA Schedule today for Flu vaccine/injection.    SPECIFIC Action To Be Taken: Orders pending, please sign.  
[FreeTextEntry1] : 61 year old gentleman with history of mental retardation, HTN, right cervical carotid pseudoaneurysm, prior stroke, atrial flutter and PAF on ILR (CHADSVASc score of 3 - HTN, prior stroke), with recurrent falls, unstable gait, and cognitive dysfunction, considered at high risk for long-term anticoagulation s/p successful LAAO with WATCHMAN implant.  Remained on short term Eliquis post WATCHMAN. HEIDI > 45 days post implant showed stable device placement without significant leak.  Eliquis was d/c and started on Plavix and ASA.  Subsequently presented to [facility] with GIB found to have esophageal tear.  Required blood transfusions. Was hospitalized for several weeks weeks prior to discharge to rehab facility for 4-5 weeks.  During this period, had increased frequency of PAF with RVR. Metoprolol was increased from 25mg to 100mg BID .\par \par At last f/up, 8/7/2019, had complaints of fatigue but otherwise asymptomatic. Metoprolol was decreased to 50mg daily.  Fatigue improved on lower dose.  Continues to tolerate ASA/Plavix without bleeding complications.  No recurrent AF on ILR for the past 4-5 months. \par Deneis CP, SOB, DAO, syncope. \par

## 2020-10-20 ENCOUNTER — OFFICE VISIT (OUTPATIENT)
Dept: HEALTH INFORMATION MANAGEMENT | Facility: MEDICAL CENTER | Age: 40
End: 2020-10-20
Payer: COMMERCIAL

## 2020-10-20 VITALS
WEIGHT: 161.8 LBS | HEIGHT: 66 IN | HEART RATE: 82 BPM | BODY MASS INDEX: 26 KG/M2 | SYSTOLIC BLOOD PRESSURE: 124 MMHG | OXYGEN SATURATION: 95 % | DIASTOLIC BLOOD PRESSURE: 82 MMHG

## 2020-10-20 DIAGNOSIS — F32.1 MODERATE SINGLE CURRENT EPISODE OF MAJOR DEPRESSIVE DISORDER (HCC): ICD-10-CM

## 2020-10-20 DIAGNOSIS — R63.5 WEIGHT GAIN: ICD-10-CM

## 2020-10-20 DIAGNOSIS — E78.00 PURE HYPERCHOLESTEROLEMIA: ICD-10-CM

## 2020-10-20 DIAGNOSIS — E55.9 VITAMIN D DEFICIENCY: ICD-10-CM

## 2020-10-20 DIAGNOSIS — E66.3 OVERWEIGHT: ICD-10-CM

## 2020-10-20 PROCEDURE — 99214 OFFICE O/P EST MOD 30 MIN: CPT | Performed by: INTERNAL MEDICINE

## 2020-10-20 RX ORDER — TOPIRAMATE 25 MG/1
25 TABLET ORAL 2 TIMES DAILY
Qty: 60 TAB | Refills: 1 | Status: SHIPPED | OUTPATIENT
Start: 2020-10-20 | End: 2020-11-02

## 2020-10-20 ASSESSMENT — FIBROSIS 4 INDEX: FIB4 SCORE: 0.55

## 2020-10-20 ASSESSMENT — PATIENT HEALTH QUESTIONNAIRE - PHQ9: CLINICAL INTERPRETATION OF PHQ2 SCORE: 0

## 2020-10-20 NOTE — PROGRESS NOTES
Bariatric Medicine Follow Up  Chief Complaint   Patient presents with   • Weight Gain       History of Present Illness:   Amber Casey is a 40 y.o. female who presents for follow-up to intensive behavioral modification of overweight and to help address co-morbidities caused by overweight, as below.    Obesity Stage/Class: 1/overweight  During the patient's last visit, the following were discussed and recommended:  Weight Goal: 140 lb  Dietary intervention:     MR: Premier protein every morning  High Protein/Low Carb whole food meals and 2 snacks between meals daily  >100 g protein, <100 g total carbs daily, 1200 kcal/d initial goal  Track daily intake with My Fitness Pal, bring to next visit  64+ oz water per day  Physical Activity: 1000 steps per day, dog walking  Labs: N/A  Diagnostics:  EKG  Rx changes:   Start Topamax for appetite suppression  Consider change from Prozac to Wellbutrin but patient to discuss with psychiatry  Reviewed the patient cannot get pregnant with Topamax, will cause birth defects  Patient with IUD and  has had vasectomy  Behavior change:   Behavioral health referral given    ___    Challenges: Recent travel, taking Topamax irregularly  Has not had time to call behavioral health yet for appointment  Dietary adherence:  Good  Usually tracking, taking some foods, using MFP and adjusted macros  Consuming smaller portions  Exercise:  Walking daily    AntiObesity Medication use: Topamax 25 mg every morning  Medication efficacy/tolerance/side effects: Effective, no side effects  Medication compliance: She is trying to take 1 tablet daily, sometimes takes 2 or sometimes skips a second dose    Status of comorbid conditions/other medical diagnoses:  HLD: Uncontrolled, with LDL high on last labs, not on statin or fenofibrate  VDD: Controlled?,  No recent labs, not on vitamin D supplement  Depression: Currently controlled, pending behavioral health eval       Review of Systems   Pt  "denies lightheadedness, weakness, worsening fatigue, excessive dry mouth, mood changes, paresthesias.  All other ROS were reviewed and are otherwise unchanged from my previous visit with patient.    Physical Exam:    /82 (BP Location: Left arm, Patient Position: Sitting, BP Cuff Size: Large adult)   Pulse 82   Ht 1.676 m (5' 6\")   Wt 73.4 kg (161 lb 12.8 oz)   SpO2 95%   BMI 26.12 kg/m²   Waist Measurement   Waist: 33.25 inch/inches  Weight change since last visit: -8.7 lb   Waist Circum change since last visit: -2 in     Constitutional: Oriented to person, place, and time and well-developed, well-nourished, and in no distress.    Head: Normocephalic.   Musculoskeletal: Normal range of motion. No edema.   Neurological: Alert and oriented to person, place, and time. No muscle weakness.  Gait normal.   Skin: Warm and dry. Not diaphoretic.   Psychiatric: Mood, memory, affect and judgment normal.     Laboratory:   Recent labs reviewed.      Dietitian Assessment: I have reviewed the Dietitian's assessment related to this encounter.     ASSESSMENT  40 y.o.  female presents for intensive lifestyle intervention for treatment of obesity and co-morbid conditions.  Obesity Stage ([place])/Class: 1/overweight    1. Weight gain     2. Pure hypercholesterolemia     3. Vitamin D deficiency     4. Moderate single current episode of major depressive disorder (HCC)     5. Overweight         Patient is doing well.  She has reversed her previous weight gain, feels much improved.  She is responding to portion control, mindfulness around food choices, with better macronutrient balance via tracking as below.  She is responding well to low-dose antiobesity medication.  Continue to monitor mood, vitamin D, lipids.    PLAN  Weight Goal: 140 lb  Dietary intervention:    MR: PP every morning  High Protein/Low Carb whole food meals and 2 snacks between meals daily  >100 g protein, <100 g total carbs daily, 1200 kcal/d   Track daily " intake with My Fitness Pal, bring to next visit  64+ oz water per day  She is mainly watching portion size  Physical Activity: Walking daily, set goals of at least 20 minutes/day  Labs: Check vitamin D level with next labs  Rx changes:   Topamax 25 twice daily, can take daily only if preferred  Side Effects: Risks, benefits, alternatives discussed, consent signed.  Behavior modification:   Pending behavioral health eval, patient to call for appointment  Surgical considerations: N/A  Follow-up: one month with MD    Patient's body mass index is 26.12 kg/m². Exercise and nutrition counseling were performed at this visit.

## 2020-10-27 ENCOUNTER — NON-PROVIDER VISIT (OUTPATIENT)
Dept: HEALTH INFORMATION MANAGEMENT | Facility: MEDICAL CENTER | Age: 40
End: 2020-10-27
Payer: COMMERCIAL

## 2020-10-27 DIAGNOSIS — E66.3 OVERWEIGHT WITH BODY MASS INDEX (BMI) OF 26 TO 26.9 IN ADULT: ICD-10-CM

## 2020-10-27 PROCEDURE — 97803 MED NUTRITION INDIV SUBSEQ: CPT | Performed by: DIETITIAN, REGISTERED

## 2020-10-27 NOTE — PROGRESS NOTES
Nutrition Reassess: Medical Weight Management   Today's date: 10/27/2020  Referring Provider: No ref. provider found      Time: in/out 9:12-9:45  Visit#: 3    Subjective:  - REE 1474   - MR shake for breakfast  - struggles with sweets, arianna during holidays she states  - has been packing leftovers!   - being more mindful of portions  - adding more NS veggies to dinners     Anthropometrics/Objective  Today's weight:  There were no vitals filed for this visit.  BMI:  There is no height or weight on file to calculate BMI.  Starting weight/date 170.5 lbs (9/16/20)      Last weight: 161.8 lbs (10/20/20)  Total weight change : NA lb            Meal Plan:   Calorie controlled, high protein, low carb diet    with 1 meal replacements per day    ReAssesment/Notes:    Amber has been working on her health goals towards weight loss. She eats a balanced plate at her meals, finds her portions and her sweet snack cravings are where she can continue to improve. We reviewed the Holiday Tips sheet at this visit and provided Amber with alternative methods for helping her be more mindful of her food choices. She states she has been using RX bars still to help curb the sweet cravings. She has been using stimulus narrowing at her home. States her daughter enjoys and is good at baking which has been difficult to navigate though she finds when they are not sitting out, she is less inclined to grab a baked good. We discussed using teas such as apple cinnamon, or brushing teeth to help her with her sweet portions when she finds herself getting more.   At next visit suggest reviewing nutrition basics.     Follow-up: 4-6 weeks

## 2020-11-02 RX ORDER — TOPIRAMATE 25 MG/1
TABLET ORAL
Qty: 30 TAB | Refills: 1 | Status: SHIPPED | OUTPATIENT
Start: 2020-11-02 | End: 2021-09-21

## 2020-11-02 NOTE — TELEPHONE ENCOUNTER
Received request via: Pharmacy    Was the patient seen in the last year in this department? Yes    Does the patient have an active prescription (recently filled or refills available) for medication(s) requested? No     LV: 10/20/2020  FV: 12/10/2020

## 2020-11-19 ENCOUNTER — NON-PROVIDER VISIT (OUTPATIENT)
Dept: MEDICAL GROUP | Facility: LAB | Age: 40
End: 2020-11-19
Payer: COMMERCIAL

## 2020-11-19 DIAGNOSIS — Z23 NEED FOR VACCINATION: ICD-10-CM

## 2020-11-19 PROCEDURE — 90686 IIV4 VACC NO PRSV 0.5 ML IM: CPT | Performed by: FAMILY MEDICINE

## 2020-11-19 PROCEDURE — 90471 IMMUNIZATION ADMIN: CPT | Performed by: FAMILY MEDICINE

## 2020-11-19 NOTE — PROGRESS NOTES
"Amber Casey is a 40 y.o. female here for a non-provider visit for:   FLU    Reason for immunization: Annual Flu Vaccine  Immunization records indicate need for vaccine: Yes, confirmed with Epic  Minimum interval has been met for this vaccine: Yes  ABN completed: Not Indicated    Order and dose verified by: AS  VIS Dated  8/15/2019 was given to patient: Yes  All IAC Questionnaire questions were answered \"No.\"    Patient tolerated injection and no adverse effects were observed or reported: Yes    Pt scheduled for next dose in series: No  "

## 2020-12-07 ENCOUNTER — PATIENT MESSAGE (OUTPATIENT)
Dept: MEDICAL GROUP | Facility: LAB | Age: 40
End: 2020-12-07

## 2020-12-07 DIAGNOSIS — Z11.1 TUBERCULOSIS SCREENING: ICD-10-CM

## 2020-12-07 NOTE — TELEPHONE ENCOUNTER
From: Amber Casey  To: Joel Wright M.D.  Sent: 12/7/2020 1:06 PM PST  Subject: Non-Urgent Medical Question    Pauline SCRUGGS,   I need an appointment/referral for a TB test. I need it for work. I’ve tested positive in the past so I know it needs to be a blood drawn test.   Thank you,  Amber Casey

## 2020-12-10 ENCOUNTER — HOSPITAL ENCOUNTER (OUTPATIENT)
Dept: LAB | Facility: MEDICAL CENTER | Age: 40
End: 2020-12-10
Attending: FAMILY MEDICINE
Payer: COMMERCIAL

## 2020-12-10 DIAGNOSIS — Z11.1 TUBERCULOSIS SCREENING: ICD-10-CM

## 2020-12-10 PROCEDURE — 86480 TB TEST CELL IMMUN MEASURE: CPT

## 2020-12-10 PROCEDURE — 36415 COLL VENOUS BLD VENIPUNCTURE: CPT

## 2020-12-14 LAB
GAMMA INTERFERON BACKGROUND BLD IA-ACNC: 0.03 IU/ML
M TB IFN-G BLD-IMP: NEGATIVE
M TB IFN-G CD4+ BCKGRND COR BLD-ACNC: 0 IU/ML
MITOGEN IGNF BCKGRD COR BLD-ACNC: >10 IU/ML
QFT TB2 - NIL TBQ2: 0.01 IU/ML

## 2021-05-06 ENCOUNTER — OFFICE VISIT (OUTPATIENT)
Dept: MEDICAL GROUP | Facility: LAB | Age: 41
End: 2021-05-06
Payer: COMMERCIAL

## 2021-05-06 VITALS
RESPIRATION RATE: 12 BRPM | DIASTOLIC BLOOD PRESSURE: 80 MMHG | SYSTOLIC BLOOD PRESSURE: 112 MMHG | TEMPERATURE: 97.5 F | BODY MASS INDEX: 26.52 KG/M2 | HEART RATE: 90 BPM | WEIGHT: 165 LBS | HEIGHT: 66 IN | OXYGEN SATURATION: 95 %

## 2021-05-06 DIAGNOSIS — F32.1 MODERATE SINGLE CURRENT EPISODE OF MAJOR DEPRESSIVE DISORDER (HCC): ICD-10-CM

## 2021-05-06 DIAGNOSIS — H91.93 BILATERAL HEARING LOSS, UNSPECIFIED HEARING LOSS TYPE: ICD-10-CM

## 2021-05-06 PROCEDURE — 99214 OFFICE O/P EST MOD 30 MIN: CPT | Performed by: FAMILY MEDICINE

## 2021-05-06 RX ORDER — FLUOXETINE HYDROCHLORIDE 20 MG/1
20 CAPSULE ORAL DAILY
Qty: 90 CAPSULE | Refills: 3 | Status: SHIPPED | OUTPATIENT
Start: 2021-05-06 | End: 2021-05-06

## 2021-05-06 RX ORDER — FLUOXETINE 20 MG/1
20 TABLET, FILM COATED ORAL DAILY
Qty: 90 TABLET | Refills: 3 | Status: SHIPPED | OUTPATIENT
Start: 2021-05-06 | End: 2021-09-21

## 2021-05-06 ASSESSMENT — FIBROSIS 4 INDEX: FIB4 SCORE: 0.55

## 2021-05-06 NOTE — PROGRESS NOTES
"Subjective:     CC: Hearing loss    HPI:   Amber presents today with:    Hearing loss  Has noted possible bilateral hearing loss for years but possibly worsening recently.  Has noticed with her kids, , and at work.  Was in the car yesterday with  and he could hear speakerphone fine but she could not hear him.  No prior work-up for this.    Mood  She would like to taper Prozac 20 mg daily.  She does think current mood is well controlled and would eventually like to try to taper off of this.      Health Maintenance: Completed    Medications, past medical history, allergies, and social history have been reviewed and updated.    ROS:  See HPI    Objective:       Exam:  /80 (BP Location: Right arm, Patient Position: Sitting, BP Cuff Size: Large adult)   Pulse 90   Temp 36.4 °C (97.5 °F)   Resp 12   Ht 1.676 m (5' 6\")   Wt 74.8 kg (165 lb)   SpO2 95%   BMI 26.63 kg/m²  Body mass index is 26.63 kg/m².    Constitutional: Alert. Well appearing. No distress.  Skin: Warm, dry, good turgor, no visible rashes.  Eye: Equal, round and reactive to light, conjunctiva clear, lids normal.  ENMT: Moist mucous membranes.  Bilateral tympanic membranes clear.  Respiratory: Normal effort.   Neuro: Moves all four extremities. No facial droop.  Psych: Answers questions appropriately. Normal affect and mood.      Assessment & Plan:     40 y.o. female with the following -     1. Bilateral hearing loss, unspecified hearing loss type  Bilateral subjective hearing loss.  Normal exam.  Referral to audiology.  - REFERRAL TO AUDIOLOGY    2. Moderate single current episode of major depressive disorder (HCC)  She would like to try to taper Prozac, plan to decrease to 20 mg daily.  If she does well with this she will decrease to 10 mg daily and then can decrease to 10 mg every other day for 2 weeks prior to discontinuing.  - fluoxetine (PROZAC) 20 MG tablet; Take 1 tablet by mouth every day.  Dispense: 90 tablet; Refill: " 3      Please note that this note was created using voice recognition software.

## 2021-06-25 DIAGNOSIS — G43.109 MIGRAINE WITH AURA AND WITHOUT STATUS MIGRAINOSUS, NOT INTRACTABLE: ICD-10-CM

## 2021-06-25 RX ORDER — RIZATRIPTAN BENZOATE 10 MG/1
TABLET ORAL
Qty: 10 TABLET | Refills: 5 | Status: SHIPPED | OUTPATIENT
Start: 2021-06-25 | End: 2021-10-31

## 2021-09-21 ENCOUNTER — OFFICE VISIT (OUTPATIENT)
Dept: MEDICAL GROUP | Facility: LAB | Age: 41
End: 2021-09-21
Payer: COMMERCIAL

## 2021-09-21 VITALS
BODY MASS INDEX: 28.25 KG/M2 | RESPIRATION RATE: 16 BRPM | HEIGHT: 66 IN | OXYGEN SATURATION: 97 % | HEART RATE: 85 BPM | WEIGHT: 175.8 LBS | DIASTOLIC BLOOD PRESSURE: 88 MMHG | TEMPERATURE: 97.5 F | SYSTOLIC BLOOD PRESSURE: 130 MMHG

## 2021-09-21 DIAGNOSIS — Z23 NEED FOR VACCINATION: ICD-10-CM

## 2021-09-21 DIAGNOSIS — Z00.00 WELL ADULT EXAM: ICD-10-CM

## 2021-09-21 DIAGNOSIS — Z12.31 ENCOUNTER FOR SCREENING MAMMOGRAM FOR BREAST CANCER: ICD-10-CM

## 2021-09-21 DIAGNOSIS — R53.83 OTHER FATIGUE: ICD-10-CM

## 2021-09-21 DIAGNOSIS — F32.1 MODERATE SINGLE CURRENT EPISODE OF MAJOR DEPRESSIVE DISORDER (HCC): ICD-10-CM

## 2021-09-21 PROCEDURE — 99214 OFFICE O/P EST MOD 30 MIN: CPT | Mod: 25 | Performed by: FAMILY MEDICINE

## 2021-09-21 PROCEDURE — 90686 IIV4 VACC NO PRSV 0.5 ML IM: CPT | Performed by: FAMILY MEDICINE

## 2021-09-21 PROCEDURE — 90471 IMMUNIZATION ADMIN: CPT | Performed by: FAMILY MEDICINE

## 2021-09-21 RX ORDER — VENLAFAXINE HYDROCHLORIDE 37.5 MG/1
37.5 CAPSULE, EXTENDED RELEASE ORAL DAILY
Qty: 7 CAPSULE | Refills: 0 | Status: SHIPPED | OUTPATIENT
Start: 2021-09-21 | End: 2021-09-28

## 2021-09-21 RX ORDER — VENLAFAXINE HYDROCHLORIDE 75 MG/1
75 CAPSULE, EXTENDED RELEASE ORAL DAILY
Qty: 30 CAPSULE | Refills: 1 | Status: SHIPPED | OUTPATIENT
Start: 2021-09-21 | End: 2021-10-13

## 2021-09-21 ASSESSMENT — PATIENT HEALTH QUESTIONNAIRE - PHQ9
6. FEELING BAD ABOUT YOURSELF - OR THAT YOU ARE A FAILURE OR HAVE LET YOURSELF OR YOUR FAMILY DOWN: MORE THAN HALF THE DAYS
8. MOVING OR SPEAKING SO SLOWLY THAT OTHER PEOPLE COULD HAVE NOTICED. OR THE OPPOSITE, BEING SO FIGETY OR RESTLESS THAT YOU HAVE BEEN MOVING AROUND A LOT MORE THAN USUAL: SEVERAL DAYS
5. POOR APPETITE OR OVEREATING: MORE THAN HALF THE DAYS
4. FEELING TIRED OR HAVING LITTLE ENERGY: NEARLY EVERY DAY
9. THOUGHTS THAT YOU WOULD BE BETTER OFF DEAD, OR OF HURTING YOURSELF: NOT AT ALL
SUM OF ALL RESPONSES TO PHQ9 QUESTIONS 1 AND 2: 3
7. TROUBLE CONCENTRATING ON THINGS, SUCH AS READING THE NEWSPAPER OR WATCHING TELEVISION: SEVERAL DAYS
3. TROUBLE FALLING OR STAYING ASLEEP OR SLEEPING TOO MUCH: NEARLY EVERY DAY
SUM OF ALL RESPONSES TO PHQ QUESTIONS 1-9: 15
2. FEELING DOWN, DEPRESSED, IRRITABLE, OR HOPELESS: SEVERAL DAYS
1. LITTLE INTEREST OR PLEASURE IN DOING THINGS: MORE THAN HALF THE DAYS

## 2021-09-21 ASSESSMENT — FIBROSIS 4 INDEX: FIB4 SCORE: 0.56

## 2021-09-21 NOTE — PROGRESS NOTES
"Subjective:     CC: Mood, fatigue    HPI:   Amber presents today with :    Fatigue  Fatigued throughout day, daytime sleepiness as well.  She feels like this may be linked to depression as she is also had some low motivation, feeling down, little interest/pleasure.    No history of snoring, no witnessed apnea.  She does not wake up rested but does think that she sleeps well.    Depression Screening    Little interest or pleasure in doing things?   More than half the days  Feeling down, depressed , or hopeless?  Several days  Trouble falling or staying asleep, or sleeping too much?   Nearly every day  Feeling tired or having little energy?   Nearly every day  Poor appetite or overeating?   More than half the days  Feeling bad about yourself - or that you are a failure or have let yourself or your family down?  More than half the days  Trouble concentrating on things, such as reading the newspaper or watching television?  Several days  Moving or speaking so slowly that other people could have noticed.  Or the opposite - being so fidgety or restless that you have been moving around a lot more than usual?   Several days  Thoughts that you would be better off dead, or of hurting yourself?   Not at all  Patient Health Questionnaire Score:  (!) 15      If depressive symptoms identified deferred to follow up visit unless specifically addressed in assesment and plan.    Interpretation of PHQ-9 Total Score   Score Severity   1-4 No Depression   5-9 Mild Depression   10-14 Moderate Depression   15-19 Moderately Severe Depression   20-27 Severe Depression    Health Maintenance: Completed    Medications, past medical history, allergies, and social history have been reviewed and updated.    ROS:  See HPI    Objective:       Exam:  /88 (BP Location: Left arm, Patient Position: Sitting, BP Cuff Size: Adult)   Pulse 85   Temp 36.4 °C (97.5 °F) (Temporal)   Resp 16   Ht 1.676 m (5' 6\")   Wt 79.7 kg (175 lb 12.8 oz)   SpO2 " 97%   BMI 28.37 kg/m²  Body mass index is 28.37 kg/m².    Constitutional: Alert. Well appearing. No distress.  Skin: Warm, dry, good turgor, no visible rashes.  Eye: Equal, round and reactive to light, conjunctiva clear, lids normal.  Respiratory: Normal effort.   Neuro: Moves all four extremities. No facial droop.  Psych: Answers questions appropriately. Normal affect and mood.      Assessment & Plan:     41 y.o. female with the following -     1. Moderate single current episode of major depressive disorder (HCC)  Possible cause of fatigue as below, PHQ-9 does indicate moderate depression continues.  Has not seen improvement with increasing Prozac dose.  We will try SNRI in hopes of more activation and help with fatigue.  Follow-up in 1 month.  - venlafaxine XR (EFFEXOR XR) 37.5 MG CAPSULE SR 24 HR; Take 1 Capsule by mouth every day for 7 days.  Dispense: 7 Capsule; Refill: 0  - venlafaxine XR (EFFEXOR XR) 75 MG CAPSULE SR 24 HR; Take 1 Capsule by mouth every day for 30 days.  Dispense: 30 Capsule; Refill: 1    2. Other fatigue  Possibly secondary to depression as above.  Will check labs as below.  - CBC WITH DIFFERENTIAL; Future  - Comp Metabolic Panel; Future  - TSH WITH REFLEX TO FT4; Future    3. Encounter for screening mammogram for breast cancer  - MA-SCREENING MAMMO BILAT W/TOMOSYNTHESIS W/CAD; Future    4. Need for vaccination  - Influenza Vaccine Quad Injection (PF)    5. Well adult exam  - CBC WITH DIFFERENTIAL; Future  - Comp Metabolic Panel; Future  - Lipid Profile; Future  - TSH WITH REFLEX TO FT4; Future      Please note that this note was created using voice recognition software.

## 2021-09-23 ENCOUNTER — HOSPITAL ENCOUNTER (OUTPATIENT)
Dept: LAB | Facility: MEDICAL CENTER | Age: 41
End: 2021-09-23
Attending: FAMILY MEDICINE
Payer: COMMERCIAL

## 2021-09-23 DIAGNOSIS — Z00.00 WELL ADULT EXAM: ICD-10-CM

## 2021-09-23 DIAGNOSIS — R53.83 OTHER FATIGUE: ICD-10-CM

## 2021-09-23 LAB
ALBUMIN SERPL BCP-MCNC: 4.1 G/DL (ref 3.2–4.9)
ALBUMIN/GLOB SERPL: 1.4 G/DL
ALP SERPL-CCNC: 52 U/L (ref 30–99)
ALT SERPL-CCNC: 12 U/L (ref 2–50)
ANION GAP SERPL CALC-SCNC: 9 MMOL/L (ref 7–16)
AST SERPL-CCNC: 12 U/L (ref 12–45)
BASOPHILS # BLD AUTO: 0.8 % (ref 0–1.8)
BASOPHILS # BLD: 0.06 K/UL (ref 0–0.12)
BILIRUB SERPL-MCNC: 0.3 MG/DL (ref 0.1–1.5)
BUN SERPL-MCNC: 13 MG/DL (ref 8–22)
CALCIUM SERPL-MCNC: 9.2 MG/DL (ref 8.5–10.5)
CHLORIDE SERPL-SCNC: 103 MMOL/L (ref 96–112)
CHOLEST SERPL-MCNC: 175 MG/DL (ref 100–199)
CO2 SERPL-SCNC: 26 MMOL/L (ref 20–33)
CREAT SERPL-MCNC: 0.59 MG/DL (ref 0.5–1.4)
EOSINOPHIL # BLD AUTO: 0.28 K/UL (ref 0–0.51)
EOSINOPHIL NFR BLD: 3.6 % (ref 0–6.9)
ERYTHROCYTE [DISTWIDTH] IN BLOOD BY AUTOMATED COUNT: 41.3 FL (ref 35.9–50)
GLOBULIN SER CALC-MCNC: 2.9 G/DL (ref 1.9–3.5)
GLUCOSE SERPL-MCNC: 82 MG/DL (ref 65–99)
HCT VFR BLD AUTO: 41.6 % (ref 37–47)
HDLC SERPL-MCNC: 40 MG/DL
HGB BLD-MCNC: 14 G/DL (ref 12–16)
IMM GRANULOCYTES # BLD AUTO: 0.02 K/UL (ref 0–0.11)
IMM GRANULOCYTES NFR BLD AUTO: 0.3 % (ref 0–0.9)
LDLC SERPL CALC-MCNC: 107 MG/DL
LYMPHOCYTES # BLD AUTO: 1.84 K/UL (ref 1–4.8)
LYMPHOCYTES NFR BLD: 23.6 % (ref 22–41)
MCH RBC QN AUTO: 31.7 PG (ref 27–33)
MCHC RBC AUTO-ENTMCNC: 33.7 G/DL (ref 33.6–35)
MCV RBC AUTO: 94.3 FL (ref 81.4–97.8)
MONOCYTES # BLD AUTO: 0.63 K/UL (ref 0–0.85)
MONOCYTES NFR BLD AUTO: 8.1 % (ref 0–13.4)
NEUTROPHILS # BLD AUTO: 4.98 K/UL (ref 2–7.15)
NEUTROPHILS NFR BLD: 63.6 % (ref 44–72)
NRBC # BLD AUTO: 0 K/UL
NRBC BLD-RTO: 0 /100 WBC
PLATELET # BLD AUTO: 262 K/UL (ref 164–446)
PMV BLD AUTO: 10.2 FL (ref 9–12.9)
POTASSIUM SERPL-SCNC: 3.9 MMOL/L (ref 3.6–5.5)
PROT SERPL-MCNC: 7 G/DL (ref 6–8.2)
RBC # BLD AUTO: 4.41 M/UL (ref 4.2–5.4)
SODIUM SERPL-SCNC: 138 MMOL/L (ref 135–145)
TRIGL SERPL-MCNC: 138 MG/DL (ref 0–149)
TSH SERPL DL<=0.005 MIU/L-ACNC: 1.9 UIU/ML (ref 0.38–5.33)
WBC # BLD AUTO: 7.8 K/UL (ref 4.8–10.8)

## 2021-09-23 PROCEDURE — 36415 COLL VENOUS BLD VENIPUNCTURE: CPT

## 2021-09-23 PROCEDURE — 80061 LIPID PANEL: CPT

## 2021-09-23 PROCEDURE — 84443 ASSAY THYROID STIM HORMONE: CPT

## 2021-09-23 PROCEDURE — 85025 COMPLETE CBC W/AUTO DIFF WBC: CPT

## 2021-09-23 PROCEDURE — 80053 COMPREHEN METABOLIC PANEL: CPT

## 2021-10-31 ENCOUNTER — HOSPITAL ENCOUNTER (OUTPATIENT)
Facility: MEDICAL CENTER | Age: 41
End: 2021-10-31
Attending: PHYSICIAN ASSISTANT
Payer: COMMERCIAL

## 2021-10-31 ENCOUNTER — OFFICE VISIT (OUTPATIENT)
Dept: URGENT CARE | Facility: CLINIC | Age: 41
End: 2021-10-31
Payer: COMMERCIAL

## 2021-10-31 VITALS
WEIGHT: 176 LBS | BODY MASS INDEX: 28.28 KG/M2 | SYSTOLIC BLOOD PRESSURE: 106 MMHG | TEMPERATURE: 97.2 F | OXYGEN SATURATION: 97 % | RESPIRATION RATE: 20 BRPM | HEART RATE: 100 BPM | DIASTOLIC BLOOD PRESSURE: 64 MMHG | HEIGHT: 66 IN

## 2021-10-31 DIAGNOSIS — J11.1 INFLUENZA-LIKE ILLNESS: ICD-10-CM

## 2021-10-31 DIAGNOSIS — Z20.818 EXPOSURE TO STREP THROAT: ICD-10-CM

## 2021-10-31 LAB
COVID ORDER STATUS COVID19: NORMAL
FLUAV+FLUBV AG SPEC QL IA: NEGATIVE
INT CON NEG: NORMAL
INT CON NEG: NORMAL
INT CON POS: NORMAL
INT CON POS: NORMAL
S PYO AG THROAT QL: NEGATIVE

## 2021-10-31 PROCEDURE — 99213 OFFICE O/P EST LOW 20 MIN: CPT | Performed by: PHYSICIAN ASSISTANT

## 2021-10-31 PROCEDURE — 87880 STREP A ASSAY W/OPTIC: CPT | Performed by: PHYSICIAN ASSISTANT

## 2021-10-31 PROCEDURE — U0005 INFEC AGEN DETEC AMPLI PROBE: HCPCS

## 2021-10-31 PROCEDURE — U0003 INFECTIOUS AGENT DETECTION BY NUCLEIC ACID (DNA OR RNA); SEVERE ACUTE RESPIRATORY SYNDROME CORONAVIRUS 2 (SARS-COV-2) (CORONAVIRUS DISEASE [COVID-19]), AMPLIFIED PROBE TECHNIQUE, MAKING USE OF HIGH THROUGHPUT TECHNOLOGIES AS DESCRIBED BY CMS-2020-01-R: HCPCS

## 2021-10-31 PROCEDURE — 87804 INFLUENZA ASSAY W/OPTIC: CPT | Performed by: PHYSICIAN ASSISTANT

## 2021-10-31 ASSESSMENT — ENCOUNTER SYMPTOMS
DIZZINESS: 0
SPUTUM PRODUCTION: 0
MYALGIAS: 1
PALPITATIONS: 0
HEADACHES: 1
VOMITING: 0
WHEEZING: 0
RHINORRHEA: 0
SORE THROAT: 0
SHORTNESS OF BREATH: 0
CHILLS: 1
SINUS PAIN: 0
NAUSEA: 0
COUGH: 1
FEVER: 0

## 2021-10-31 ASSESSMENT — FIBROSIS 4 INDEX: FIB4 SCORE: 0.54

## 2021-10-31 NOTE — PROGRESS NOTES
Subjective     Abmer Casey is a 41 y.o. female who presents with Fatigue (x 2 days, cough, sweats and chills, Strep exposure)    URI   This is a new problem. The current episode started in the past 7 days (symptoms started ~ 48 hours ago). The problem has been unchanged. There has been no fever. Associated symptoms include coughing, headaches and joint pain. Pertinent negatives include no chest pain, congestion, nausea, plugged ear sensation, rash, rhinorrhea, sinus pain, sneezing, sore throat, vomiting or wheezing. Associated symptoms comments: Chills, extreme fatigue. Treatments tried: NyQuil. The treatment provided mild relief.   Patient is fully vaccinated for COVID-19 virus.  Note that she works at a  and she has had exposures to strep throat.  No other exposures to illnesses that she is aware of.  She denies sore throat, however.    Review of Systems   Constitutional: Positive for chills and malaise/fatigue. Negative for fever.   HENT: Negative for congestion, rhinorrhea, sinus pain, sneezing and sore throat.    Respiratory: Positive for cough. Negative for sputum production, shortness of breath and wheezing.    Cardiovascular: Negative for chest pain and palpitations.   Gastrointestinal: Negative for nausea and vomiting.   Musculoskeletal: Positive for joint pain and myalgias.   Skin: Negative for rash.   Neurological: Positive for headaches. Negative for dizziness.       PMH:  has a past medical history of Anxiety, Depression, and Migraine.  MEDS:   Current Outpatient Medications:   •  venlafaxine XR (EFFEXOR XR) 75 MG CAPSULE SR 24 HR, TAKE 1 CAPSULE BY MOUTH EVERY DAY, Disp: 90 Capsule, Rfl: 3  •  rizatriptan (MAXALT) 10 MG tablet, TAKE 1 TABLET BY MOUTH ONCE AS NEEDED FOR MIGRAINE. (Patient not taking: Reported on 10/31/2021), Disp: 10 tablet, Rfl: 5  •  ascorbic acid (ASCORBIC ACID) 500 MG Tab, Take 500 mg by mouth every day. (Patient not taking: Reported on 10/31/2021), Disp: , Rfl:  "  ALLERGIES:   Allergies   Allergen Reactions   • Phenothiazines      dystonia     SURGHX:   Past Surgical History:   Procedure Laterality Date   • TONSILLECTOMY       SOCHX:  reports that she has never smoked. She has never used smokeless tobacco. She reports previous alcohol use. She reports that she does not use drugs.  FH: Family history was reviewed, no pertinent findings to report      Objective     /64 (BP Location: Left arm, Patient Position: Sitting, BP Cuff Size: Adult)   Pulse 100   Temp 36.2 °C (97.2 °F) (Temporal)   Resp 20   Ht 1.676 m (5' 6\")   Wt 79.8 kg (176 lb)   SpO2 97%   Breastfeeding No   BMI 28.41 kg/m²      Physical Exam  Constitutional:       Appearance: She is well-developed.   HENT:      Head: Normocephalic and atraumatic.      Right Ear: Tympanic membrane, ear canal and external ear normal.      Left Ear: Tympanic membrane, ear canal and external ear normal.      Nose: Nose normal.      Mouth/Throat:      Lips: Pink.      Mouth: Mucous membranes are moist.      Pharynx: Oropharynx is clear.   Eyes:      Conjunctiva/sclera: Conjunctivae normal.      Pupils: Pupils are equal, round, and reactive to light.   Cardiovascular:      Rate and Rhythm: Normal rate and regular rhythm.      Heart sounds: Normal heart sounds. No murmur heard.     Pulmonary:      Effort: Pulmonary effort is normal.      Breath sounds: Normal breath sounds. No decreased breath sounds, wheezing, rhonchi or rales.   Musculoskeletal:      Cervical back: Normal range of motion.   Lymphadenopathy:      Cervical: No cervical adenopathy.   Skin:     General: Skin is warm and dry.      Capillary Refill: Capillary refill takes less than 2 seconds.   Neurological:      Mental Status: She is alert and oriented to person, place, and time.   Psychiatric:         Behavior: Behavior normal.         Judgment: Judgment normal.           POCT Rapid Strep A - Negative      POCT Influenza A/B - Negative  Assessment & Plan "     1. Influenza-like illness  - POCT Influenza A/B  - COVID/SARS CoV-2 PCR; Future  - OTC cold/flu medications  - PO fluids  - Rest  - Tylenol or ibuprofen as needed for fever > 100.4 F    2. Exposure to strep throat  - POCT Rapid Strep A  Rapid strep is negative and patient is not having sore throat.  Discussed with her that I feel that we do not need to investigate this further.          *Patient had a nasal swab to test for COVID-19 virus.  Patient was advised to stay home and self isolate/self quarantine while awaiting the results.  Supportive care was reiterated.  Return/ER precautions discussed.             Differential Diagnosis, natural history, and supportive care discussed. Return to the Urgent Care or follow up with your PCP if symptoms fail to resolve, or for any new or worsening symptoms. Emergency room precautions discussed. Patient and/or family appears understanding of information.

## 2021-11-01 LAB
SARS-COV-2 RNA RESP QL NAA+PROBE: DETECTED
SPECIMEN SOURCE: ABNORMAL

## 2021-11-16 ENCOUNTER — APPOINTMENT (OUTPATIENT)
Dept: RADIOLOGY | Facility: MEDICAL CENTER | Age: 41
End: 2021-11-16
Attending: FAMILY MEDICINE
Payer: COMMERCIAL

## 2022-01-06 ENCOUNTER — PATIENT MESSAGE (OUTPATIENT)
Dept: MEDICAL GROUP | Facility: LAB | Age: 42
End: 2022-01-06

## 2022-01-06 DIAGNOSIS — G43.109 MIGRAINE WITH AURA AND WITHOUT STATUS MIGRAINOSUS, NOT INTRACTABLE: ICD-10-CM

## 2022-01-11 RX ORDER — RIZATRIPTAN BENZOATE 10 MG/1
10 TABLET ORAL
Qty: 10 TABLET | Refills: 3 | Status: SHIPPED | OUTPATIENT
Start: 2022-01-11 | End: 2023-06-26

## 2022-03-30 ENCOUNTER — PATIENT MESSAGE (OUTPATIENT)
Dept: MEDICAL GROUP | Facility: LAB | Age: 42
End: 2022-03-30
Payer: COMMERCIAL

## 2022-03-30 DIAGNOSIS — Z00.00 WELL ADULT EXAM: ICD-10-CM

## 2022-04-15 ENCOUNTER — HOSPITAL ENCOUNTER (OUTPATIENT)
Dept: RADIOLOGY | Facility: MEDICAL CENTER | Age: 42
End: 2022-04-15
Attending: FAMILY MEDICINE
Payer: COMMERCIAL

## 2022-04-15 DIAGNOSIS — Z12.31 ENCOUNTER FOR SCREENING MAMMOGRAM FOR BREAST CANCER: ICD-10-CM

## 2022-04-15 PROCEDURE — 77063 BREAST TOMOSYNTHESIS BI: CPT

## 2022-04-19 ENCOUNTER — HOSPITAL ENCOUNTER (OUTPATIENT)
Dept: LAB | Facility: MEDICAL CENTER | Age: 42
End: 2022-04-19
Attending: FAMILY MEDICINE
Payer: COMMERCIAL

## 2022-04-19 DIAGNOSIS — Z00.00 WELL ADULT EXAM: ICD-10-CM

## 2022-04-19 LAB
ALBUMIN SERPL BCP-MCNC: 4.4 G/DL (ref 3.2–4.9)
ALBUMIN/GLOB SERPL: 1.8 G/DL
ALP SERPL-CCNC: 54 U/L (ref 30–99)
ALT SERPL-CCNC: 24 U/L (ref 2–50)
ANION GAP SERPL CALC-SCNC: 9 MMOL/L (ref 7–16)
AST SERPL-CCNC: 18 U/L (ref 12–45)
BASOPHILS # BLD AUTO: 0.7 % (ref 0–1.8)
BASOPHILS # BLD: 0.06 K/UL (ref 0–0.12)
BILIRUB SERPL-MCNC: 0.2 MG/DL (ref 0.1–1.5)
BUN SERPL-MCNC: 16 MG/DL (ref 8–22)
CALCIUM SERPL-MCNC: 9.1 MG/DL (ref 8.5–10.5)
CHLORIDE SERPL-SCNC: 104 MMOL/L (ref 96–112)
CHOLEST SERPL-MCNC: 198 MG/DL (ref 100–199)
CO2 SERPL-SCNC: 26 MMOL/L (ref 20–33)
CREAT SERPL-MCNC: 0.56 MG/DL (ref 0.5–1.4)
EOSINOPHIL # BLD AUTO: 0.31 K/UL (ref 0–0.51)
EOSINOPHIL NFR BLD: 3.7 % (ref 0–6.9)
ERYTHROCYTE [DISTWIDTH] IN BLOOD BY AUTOMATED COUNT: 42.8 FL (ref 35.9–50)
FASTING STATUS PATIENT QL REPORTED: NORMAL
GFR SERPLBLD CREATININE-BSD FMLA CKD-EPI: 117 ML/MIN/1.73 M 2
GLOBULIN SER CALC-MCNC: 2.5 G/DL (ref 1.9–3.5)
GLUCOSE SERPL-MCNC: 88 MG/DL (ref 65–99)
HCT VFR BLD AUTO: 44.8 % (ref 37–47)
HDLC SERPL-MCNC: 43 MG/DL
HGB BLD-MCNC: 14.9 G/DL (ref 12–16)
IMM GRANULOCYTES # BLD AUTO: 0.02 K/UL (ref 0–0.11)
IMM GRANULOCYTES NFR BLD AUTO: 0.2 % (ref 0–0.9)
LDLC SERPL CALC-MCNC: 130 MG/DL
LYMPHOCYTES # BLD AUTO: 2.3 K/UL (ref 1–4.8)
LYMPHOCYTES NFR BLD: 27.4 % (ref 22–41)
MCH RBC QN AUTO: 31.4 PG (ref 27–33)
MCHC RBC AUTO-ENTMCNC: 33.3 G/DL (ref 33.6–35)
MCV RBC AUTO: 94.3 FL (ref 81.4–97.8)
MONOCYTES # BLD AUTO: 0.62 K/UL (ref 0–0.85)
MONOCYTES NFR BLD AUTO: 7.4 % (ref 0–13.4)
NEUTROPHILS # BLD AUTO: 5.09 K/UL (ref 2–7.15)
NEUTROPHILS NFR BLD: 60.6 % (ref 44–72)
NRBC # BLD AUTO: 0 K/UL
NRBC BLD-RTO: 0 /100 WBC
PLATELET # BLD AUTO: 261 K/UL (ref 164–446)
PMV BLD AUTO: 10 FL (ref 9–12.9)
POTASSIUM SERPL-SCNC: 4.4 MMOL/L (ref 3.6–5.5)
PROT SERPL-MCNC: 6.9 G/DL (ref 6–8.2)
RBC # BLD AUTO: 4.75 M/UL (ref 4.2–5.4)
SODIUM SERPL-SCNC: 139 MMOL/L (ref 135–145)
TRIGL SERPL-MCNC: 123 MG/DL (ref 0–149)
TSH SERPL DL<=0.005 MIU/L-ACNC: 2.15 UIU/ML (ref 0.38–5.33)
WBC # BLD AUTO: 8.4 K/UL (ref 4.8–10.8)

## 2022-04-19 PROCEDURE — 80061 LIPID PANEL: CPT

## 2022-04-19 PROCEDURE — 36415 COLL VENOUS BLD VENIPUNCTURE: CPT

## 2022-04-19 PROCEDURE — 84443 ASSAY THYROID STIM HORMONE: CPT

## 2022-04-19 PROCEDURE — 80053 COMPREHEN METABOLIC PANEL: CPT

## 2022-04-19 PROCEDURE — 85025 COMPLETE CBC W/AUTO DIFF WBC: CPT

## 2022-04-21 ENCOUNTER — OFFICE VISIT (OUTPATIENT)
Dept: MEDICAL GROUP | Facility: LAB | Age: 42
End: 2022-04-21
Payer: COMMERCIAL

## 2022-04-21 VITALS
BODY MASS INDEX: 29.09 KG/M2 | OXYGEN SATURATION: 94 % | DIASTOLIC BLOOD PRESSURE: 84 MMHG | TEMPERATURE: 97.4 F | RESPIRATION RATE: 14 BRPM | SYSTOLIC BLOOD PRESSURE: 122 MMHG | HEIGHT: 66 IN | WEIGHT: 181 LBS | HEART RATE: 88 BPM

## 2022-04-21 DIAGNOSIS — E66.3 OVERWEIGHT: ICD-10-CM

## 2022-04-21 DIAGNOSIS — Z00.00 WELL ADULT EXAM: ICD-10-CM

## 2022-04-21 PROCEDURE — 99396 PREV VISIT EST AGE 40-64: CPT | Performed by: FAMILY MEDICINE

## 2022-04-21 RX ORDER — PHENTERMINE HYDROCHLORIDE 37.5 MG/1
37.5 CAPSULE ORAL EVERY MORNING
Qty: 30 CAPSULE | Refills: 0 | Status: SHIPPED | OUTPATIENT
Start: 2022-04-21 | End: 2022-05-21

## 2022-04-21 ASSESSMENT — FIBROSIS 4 INDEX: FIB4 SCORE: 0.58

## 2022-04-21 ASSESSMENT — PATIENT HEALTH QUESTIONNAIRE - PHQ9: CLINICAL INTERPRETATION OF PHQ2 SCORE: 0

## 2022-04-21 NOTE — PROGRESS NOTES
Subjective:     CC:   Chief Complaint   Patient presents with   • Annual Exam       HPI:   Amber Casey is a 41 y.o. female who presents for annual exam    Patient has GYN provider: Yes  Last Pap Smear: 2018   H/O Abnormal Pap: No  Last Mammogram: Last week - has diagnostic and US pending  Last Tdap: 2013  Received HPV series: Aged out    The 10-year ASCVD risk score (Junior PEARSON Jr., et al., 2013) is: 0.9%    Exercise:yes, occasional  Diet: working on less sugar      Continues to have difficulty losing weight.  Previously followed with health improvement clinic in [place] on Topamax but this did not seem effective.  She would like to try a different medication for weight loss if available.    OB History    Para Term  AB Living   3 3 0 0 0 0   SAB IAB Ectopic Molar Multiple Live Births   0 0 0 0 0 0      She  reports being sexually active and has had partner(s) who are male. She reports using the following method of birth control/protection: I.U.D..    She  has a past medical history of Anxiety, Depression, and Migraine.  She  has a past surgical history that includes tonsillectomy.    Family History   Problem Relation Age of Onset   • Psychiatric Illness Mother    • Hypertension Mother    • Hyperlipidemia Mother    • Hypertension Father    • Hyperlipidemia Father    • Diabetes Paternal Grandmother      Social History     Tobacco Use   • Smoking status: Never Smoker   • Smokeless tobacco: Never Used   Vaping Use   • Vaping Use: Never used   Substance Use Topics   • Alcohol use: Not Currently     Comment: Occ   • Drug use: No       Patient Active Problem List    Diagnosis Date Noted   • Weight gain 2020   • Overweight 2020   • Vitamin D deficiency 2017   • Pure hypercholesterolemia 2017   • Migraine with aura and without status migrainosus, not intractable 2017   • Moderate single current episode of major depressive disorder (HCC) 2017   • Abnormal uterine  "bleeding 01/04/2017     Current Outpatient Medications   Medication Sig Dispense Refill   • rizatriptan (MAXALT) 10 MG tablet Take 1 Tablet by mouth one time as needed for Migraine for up to 1 dose. 10 Tablet 3   • venlafaxine XR (EFFEXOR XR) 75 MG CAPSULE SR 24 HR TAKE 1 CAPSULE BY MOUTH EVERY DAY 90 Capsule 3     No current facility-administered medications for this visit.     Allergies   Allergen Reactions   • Phenothiazines      dystonia       Review of Systems   Constitutional: Negative for fever, chills and malaise/fatigue.   HENT: Negative for congestion.    Eyes: Negative for pain.   Respiratory: Negative for cough and shortness of breath.    Cardiovascular: Negative for chest pain and leg swelling.   Gastrointestinal: Negative for nausea, vomiting, abdominal pain and diarrhea.   Genitourinary: Negative for dysuria and hematuria.   Skin: Negative for rash.   Neurological: Negative for dizziness, focal weakness and headaches.   Endo/Heme/Allergies: Does not bruise/bleed easily.   Psychiatric/Behavioral: Negative for depression.  The patient is not nervous/anxious.      Objective:   /84 (BP Location: Right arm, Patient Position: Sitting, BP Cuff Size: Adult)   Pulse 88   Temp 36.3 °C (97.4 °F)   Resp 14   Ht 1.676 m (5' 6\")   Wt 82.1 kg (181 lb)   SpO2 94%   BMI 29.21 kg/m²     Wt Readings from Last 4 Encounters:   04/21/22 82.1 kg (181 lb)   10/31/21 79.8 kg (176 lb)   09/21/21 79.7 kg (175 lb 12.8 oz)   05/06/21 74.8 kg (165 lb)     Physical Exam:  Constitutional: Well-developed and well-nourished. Not diaphoretic. No distress.   Skin: Skin is warm and dry. No rash noted.  Head: Atraumatic without lesions.  Eyes: Conjunctivae and extraocular motions are normal.   Ears:  External ears unremarkable. dline. Turbinates without erythema nor edema. No discharge.   Neck: Supple, trachea midline.  Cardiovascular: Regular rate and rhythm, S1 and S2 without murmur, rubs, or gallops.    Respiratory: Effort " normal. Clear to auscultation throughout. No adventitious sounds.   Extremities: No cyanosis, clubbing, erythema, nor edema. Distal pulses intact and symmetric.   Musculoskeletal: All major joints AROM full in all directions without pain.  Neurological: Alert and oriented x 3. Grossly non-focal. Strength and sensation grossly intact.   Psychiatric:  Behavior, mood, and affect are appropriate.    Assessment and Plan:     1. Well adult exam  Health maintenance reviewed and updated.  Age-appropriate anticipatory guidance provided.    2. Overweight  Continues to struggle with weight loss.  Previously followed with health improvement program but did not see benefit from Topamax and would like to try different medication.  Discussed options and she would like to trial phentermine, discussed possible side effects.  PDMP reviewed.  Discussed plan to only continue this for 3 months.  Follow-up 1 month.  - phentermine 37.5 MG capsule; Take 1 Capsule by mouth every morning for 30 days.  Dispense: 30 Capsule; Refill: 0    Follow-up: Return in about 4 weeks (around 5/19/2022).

## 2022-04-26 ENCOUNTER — HOSPITAL ENCOUNTER (OUTPATIENT)
Dept: RADIOLOGY | Facility: MEDICAL CENTER | Age: 42
End: 2022-04-26
Attending: FAMILY MEDICINE
Payer: COMMERCIAL

## 2022-04-26 DIAGNOSIS — R92.8 ABNORMAL MAMMOGRAM: ICD-10-CM

## 2022-04-26 PROCEDURE — G0279 TOMOSYNTHESIS, MAMMO: HCPCS

## 2022-04-26 PROCEDURE — 76642 ULTRASOUND BREAST LIMITED: CPT | Mod: RT

## 2022-05-24 ENCOUNTER — OFFICE VISIT (OUTPATIENT)
Dept: MEDICAL GROUP | Facility: LAB | Age: 42
End: 2022-05-24
Payer: COMMERCIAL

## 2022-05-24 VITALS
TEMPERATURE: 97.1 F | WEIGHT: 174.4 LBS | BODY MASS INDEX: 28.03 KG/M2 | SYSTOLIC BLOOD PRESSURE: 136 MMHG | OXYGEN SATURATION: 98 % | HEIGHT: 66 IN | HEART RATE: 88 BPM | DIASTOLIC BLOOD PRESSURE: 88 MMHG

## 2022-05-24 DIAGNOSIS — E66.3 OVERWEIGHT: ICD-10-CM

## 2022-05-24 PROCEDURE — 99214 OFFICE O/P EST MOD 30 MIN: CPT | Performed by: FAMILY MEDICINE

## 2022-05-24 RX ORDER — PHENTERMINE HYDROCHLORIDE 37.5 MG/1
37.5 TABLET ORAL
Qty: 30 TABLET | Refills: 1 | Status: SHIPPED | OUTPATIENT
Start: 2022-05-24 | End: 2022-06-23

## 2022-05-24 ASSESSMENT — FIBROSIS 4 INDEX: FIB4 SCORE: 0.58

## 2022-05-24 NOTE — PROGRESS NOTES
"Subjective:     CC: Med follow up    HPI:   Amber presents today follow-up on weight loss.  Phentermine started 1 month ago and she has seen about 6 pound reduction secondary to lowered appetite.  Working on staying active as well.  No issues with sleep or agitation.    Medications, past medical history, allergies, and social history have been reviewed and updated.      Objective:       Exam:  /88 (BP Location: Right arm, Patient Position: Sitting, BP Cuff Size: Adult)   Pulse 88   Temp 36.2 °C (97.1 °F) (Temporal)   Ht 1.676 m (5' 6\")   Wt 79.1 kg (174 lb 6.4 oz)   SpO2 98%   BMI 28.15 kg/m²  Body mass index is 28.15 kg/m².    Constitutional: Alert. Well appearing. No distress.  Skin: Warm, dry, good turgor, no visible rashes.  Eye: Equal, round and reactive to light, conjunctiva clear, lids normal.  Respiratory: Normal effort.   Neuro: Moves all four extremities. No facial droop.  Psych: Answers questions appropriately. Normal affect and mood.    Assessment & Plan:     41 y.o. female with the following -     1. Overweight  Phentermine started 1 month ago, she is seeing success with weight loss.  No significant side effects.  Continue Phentermine for 2 more months.  - phentermine (ADIPEX-P) 37.5 MG tablet; Take 1 Tablet by mouth every morning before breakfast for 30 days.  Dispense: 30 Tablet; Refill: 1    Please note that this note was created using voice recognition software.      "

## 2022-08-04 ENCOUNTER — OFFICE VISIT (OUTPATIENT)
Dept: MEDICAL GROUP | Facility: LAB | Age: 42
End: 2022-08-04
Payer: COMMERCIAL

## 2022-08-04 VITALS
HEIGHT: 66 IN | TEMPERATURE: 97.1 F | SYSTOLIC BLOOD PRESSURE: 128 MMHG | DIASTOLIC BLOOD PRESSURE: 86 MMHG | OXYGEN SATURATION: 98 % | WEIGHT: 166 LBS | RESPIRATION RATE: 14 BRPM | BODY MASS INDEX: 26.68 KG/M2 | HEART RATE: 88 BPM

## 2022-08-04 DIAGNOSIS — E66.3 OVERWEIGHT: ICD-10-CM

## 2022-08-04 PROCEDURE — 99214 OFFICE O/P EST MOD 30 MIN: CPT | Performed by: FAMILY MEDICINE

## 2022-08-04 RX ORDER — PHENTERMINE HYDROCHLORIDE 15 MG/1
15 CAPSULE ORAL EVERY MORNING
Qty: 30 CAPSULE | Refills: 2 | Status: SHIPPED | OUTPATIENT
Start: 2022-08-04 | End: 2022-09-03

## 2022-08-04 RX ORDER — PHENTERMINE HYDROCHLORIDE 37.5 MG/1
37.5 TABLET ORAL EVERY MORNING
Qty: 30 TABLET | Refills: 2 | Status: CANCELLED | OUTPATIENT
Start: 2022-08-04 | End: 2022-11-02

## 2022-08-04 RX ORDER — PHENTERMINE HYDROCHLORIDE 37.5 MG/1
TABLET ORAL
COMMUNITY
Start: 2022-06-29 | End: 2022-08-04

## 2022-08-04 ASSESSMENT — FIBROSIS 4 INDEX: FIB4 SCORE: 0.59

## 2022-08-04 NOTE — PROGRESS NOTES
"Subjective:     CC: Weight, phentermine refill    HPI:   Amber presents today to discuss weight loss and phentermine refill.  She has had successful weight loss with phentermine over the last 3 months, has lost at least 15 pounds.  She notes that it is very effective in appetite reduction.  She is hoping to continue this for a few more months although would consider lower dosage.    Medications, past medical history, allergies, and social history have been reviewed and updated.      Objective:       Exam:  /86 (BP Location: Right arm, Patient Position: Sitting, BP Cuff Size: Adult)   Pulse 88   Temp 36.2 °C (97.1 °F)   Resp 14   Ht 1.676 m (5' 6\")   Wt 75.3 kg (166 lb)   SpO2 98%   BMI 26.79 kg/m²  Body mass index is 26.79 kg/m².    Constitutional: Alert. Well appearing. No distress.  Skin: Warm, dry, good turgor, no visible rashes.  Eye: Equal, round and reactive to light, conjunctiva clear, lids normal.  Respiratory: Normal effort.  Neuro: Moves all four extremities. No facial droop.  Psych: Answers questions appropriately. Normal affect and mood.      Assessment & Plan:     42 y.o. female with the following -     1. Overweight  Successful weight loss over the last 3 months with phentermine, she would like to continue this if possible.  She understands the cardiovascular risks.  Will decrease dose to 15 mg and continue for 3 months.  Discussed that I would not continue this for any longer as this will be 6 months total and she has had successful weight loss.  - phentermine 15 MG capsule; Take 1 Capsule by mouth every morning for 30 days.  Dispense: 30 Capsule; Refill: 2         Please note that this note was created using voice recognition software.    "

## 2022-10-26 DIAGNOSIS — F32.1 MODERATE SINGLE CURRENT EPISODE OF MAJOR DEPRESSIVE DISORDER (HCC): ICD-10-CM

## 2022-10-26 RX ORDER — VENLAFAXINE HYDROCHLORIDE 75 MG/1
CAPSULE, EXTENDED RELEASE ORAL
Qty: 90 CAPSULE | Refills: 3 | Status: SHIPPED | OUTPATIENT
Start: 2022-10-26 | End: 2023-07-13

## 2023-01-03 ENCOUNTER — OFFICE VISIT (OUTPATIENT)
Dept: MEDICAL GROUP | Facility: LAB | Age: 43
End: 2023-01-03
Payer: COMMERCIAL

## 2023-01-03 VITALS
HEIGHT: 66 IN | WEIGHT: 175.2 LBS | RESPIRATION RATE: 12 BRPM | DIASTOLIC BLOOD PRESSURE: 90 MMHG | OXYGEN SATURATION: 98 % | SYSTOLIC BLOOD PRESSURE: 140 MMHG | TEMPERATURE: 97 F | BODY MASS INDEX: 28.16 KG/M2 | HEART RATE: 100 BPM

## 2023-01-03 DIAGNOSIS — Z23 NEED FOR VACCINATION: ICD-10-CM

## 2023-01-03 DIAGNOSIS — T88.7XXA SIDE EFFECT OF DRUG: ICD-10-CM

## 2023-01-03 PROCEDURE — 90471 IMMUNIZATION ADMIN: CPT | Performed by: FAMILY MEDICINE

## 2023-01-03 PROCEDURE — 99214 OFFICE O/P EST MOD 30 MIN: CPT | Mod: 25 | Performed by: FAMILY MEDICINE

## 2023-01-03 PROCEDURE — 90686 IIV4 VACC NO PRSV 0.5 ML IM: CPT | Performed by: FAMILY MEDICINE

## 2023-01-03 ASSESSMENT — FIBROSIS 4 INDEX: FIB4 SCORE: 0.59

## 2023-01-03 NOTE — PROGRESS NOTES
"Subjective:     CC: Tamiflu side effects    HPI:   Amber presents today to follow-up on Tamiflu side effects.  She was seen by [facility] urgent care and prescribed Tamiflu 12/14.  She had significant psychiatric side effects including extremely depressed mood and suicidal thoughts.  She stopped Tamiflu and all symptoms completely resolved.  Upper respiratory symptoms also resolved.  Had not used Tamiflu prior to this.  She would like a flu shot today.      Medications, past medical history, allergies, and social history have been reviewed and updated.      Objective:       Exam:  BP (!) 140/90 (BP Location: Left arm, Patient Position: Sitting, BP Cuff Size: Adult)   Pulse 100   Temp 36.1 °C (97 °F)   Resp 12   Ht 1.676 m (5' 6\")   Wt 79.5 kg (175 lb 3.2 oz)   SpO2 98%   BMI 28.28 kg/m²  Body mass index is 28.28 kg/m².    Constitutional: Alert. Well appearing. No distress.  Skin: Warm, dry, good turgor, no visible rashes.  Eye: Equal, round and reactive to light, conjunctiva clear, lids normal.  Respiratory: Normal effort.  Neuro: Moves all four extremities. No facial droop.  Psych: Answers questions appropriately. Normal affect and mood.      Assessment & Plan:     42 y.o. female with the following -     1. Side effect of drug  Significant psychiatric side effects with recent Tamiflu use after being prescribed this in urgent care.  She had extreme depression and suicidal thoughts.  No prior Tamiflu use.  All symptoms resolved with stopping Tamiflu.  Agreed that this was likely a side effect and we discussed avoiding Tamiflu in the future unless she has severe case of confirmed influenza.    2. Need for vaccination  - INFLUENZA VACCINE QUAD INJ (PF)      Please note that this note was created using voice recognition software.      "

## 2023-02-23 ENCOUNTER — OFFICE VISIT (OUTPATIENT)
Dept: MEDICAL GROUP | Facility: LAB | Age: 43
End: 2023-02-23
Payer: COMMERCIAL

## 2023-02-23 VITALS
HEIGHT: 66 IN | WEIGHT: 181 LBS | BODY MASS INDEX: 29.09 KG/M2 | DIASTOLIC BLOOD PRESSURE: 78 MMHG | HEART RATE: 100 BPM | TEMPERATURE: 99 F | SYSTOLIC BLOOD PRESSURE: 136 MMHG | OXYGEN SATURATION: 96 % | RESPIRATION RATE: 16 BRPM

## 2023-02-23 DIAGNOSIS — R05.9 COUGH, UNSPECIFIED TYPE: ICD-10-CM

## 2023-02-23 PROCEDURE — 99214 OFFICE O/P EST MOD 30 MIN: CPT | Performed by: FAMILY MEDICINE

## 2023-02-23 RX ORDER — CODEINE PHOSPHATE AND GUAIFENESIN 10; 100 MG/5ML; MG/5ML
5 SOLUTION ORAL EVERY 6 HOURS PRN
Qty: 118 ML | Refills: 0 | Status: SHIPPED | OUTPATIENT
Start: 2023-02-23 | End: 2023-03-01

## 2023-02-23 RX ORDER — AMOXICILLIN AND CLAVULANATE POTASSIUM 875; 125 MG/1; MG/1
1 TABLET, FILM COATED ORAL 2 TIMES DAILY
Qty: 14 TABLET | Refills: 0 | Status: SHIPPED | OUTPATIENT
Start: 2023-02-23 | End: 2023-07-13

## 2023-02-23 RX ORDER — METHYLPREDNISOLONE 4 MG/1
TABLET ORAL
Qty: 21 TABLET | Refills: 0 | Status: SHIPPED | OUTPATIENT
Start: 2023-02-23 | End: 2023-07-13

## 2023-02-23 RX ORDER — ALBUTEROL SULFATE 90 UG/1
2 AEROSOL, METERED RESPIRATORY (INHALATION) EVERY 4 HOURS PRN
Qty: 1 EACH | Refills: 0 | Status: SHIPPED | OUTPATIENT
Start: 2023-02-23 | End: 2023-03-22

## 2023-02-23 ASSESSMENT — FIBROSIS 4 INDEX: FIB4 SCORE: 0.59

## 2023-02-24 NOTE — PROGRESS NOTES
Chief Complaint:   Chief Complaint   Patient presents with    URI     Cough X2 weeks    Sleep Disruption       HPI: Established patient    Amber Casey is a 42 y.o. female who presents for evaluation of the following today:    Cough, unspecified type    Cough started 2 weeks ago, patient has been doing a lot of over-the-counter medications supportive care and conservative management but its not improving and getting worse.  Still coughing a lot especially at night  Reports no fever at this time, no GI symptoms.  Reports mild shortness of breath.    Home COVID test were negative twice    Past medical history, family history, social history and medications reviewed and updated in the record.  Today  Current medications, problem list and allergies reviewed in EPIC today  Health maintenance topics are reviewed and updated.    Patient Active Problem List    Diagnosis Date Noted    Weight gain 09/16/2020    Overweight 09/16/2020    Vitamin D deficiency 02/16/2017    Pure hypercholesterolemia 02/16/2017    Migraine with aura and without status migrainosus, not intractable 01/04/2017    Moderate single current episode of major depressive disorder (HCC) 01/04/2017    Abnormal uterine bleeding 01/04/2017     Family History   Problem Relation Age of Onset    Psychiatric Illness Mother     Hypertension Mother     Hyperlipidemia Mother     Hypertension Father     Hyperlipidemia Father     Diabetes Paternal Grandmother      Social History     Socioeconomic History    Marital status:      Spouse name: Not on file    Number of children: Not on file    Years of education: Not on file    Highest education level: Not on file   Occupational History    Not on file   Tobacco Use    Smoking status: Never    Smokeless tobacco: Never   Vaping Use    Vaping Use: Never used   Substance and Sexual Activity    Alcohol use: Not Currently     Comment: Occ    Drug use: No    Sexual activity: Yes     Partners: Male     Birth  "control/protection: I.U.D.   Other Topics Concern    Not on file   Social History Narrative    Not on file     Social Determinants of Health     Financial Resource Strain: Not on file   Food Insecurity: Not on file   Transportation Needs: Not on file   Physical Activity: Not on file   Stress: Not on file   Social Connections: Not on file   Intimate Partner Violence: Not on file   Housing Stability: Not on file     Current Outpatient Medications   Medication Sig Dispense Refill    amoxicillin-clavulanate (AUGMENTIN) 875-125 MG Tab Take 1 Tablet by mouth 2 times a day. 14 Tablet 0    methylPREDNISolone (MEDROL DOSEPAK) 4 MG Tablet Therapy Pack As directed on the packaging label. 21 Tablet 0    albuterol 108 (90 Base) MCG/ACT Aero Soln inhalation aerosol Inhale 2 Puffs every four hours as needed for Shortness of Breath. 1 Each 0    guaifenesin-codeine (ROBITUSSIN AC) Solution oral solution Take 5 mL by mouth every 6 hours as needed for Cough for up to 6 days. 118 mL 0    venlafaxine XR (EFFEXOR XR) 75 MG CAPSULE SR 24 HR TAKE 1 CAPSULE BY MOUTH EVERY DAY 90 Capsule 3    rizatriptan (MAXALT) 10 MG tablet Take 1 Tablet by mouth one time as needed for Migraine for up to 1 dose. 10 Tablet 3     No current facility-administered medications for this visit.           Review Of Systems  As documented in HPI above  PHYSICAL EXAMINATION:    /78 (BP Location: Right arm, Patient Position: Sitting, BP Cuff Size: Adult)   Pulse 100   Temp 37.2 °C (99 °F) (Temporal)   Resp 16   Ht 1.676 m (5' 6\")   Wt 82.1 kg (181 lb)   SpO2 96%   BMI 29.21 kg/m²   Persistent cough during the encounter expiratory rhonchi  Gen.: Well-developed, well-nourished, no apparent distress, pleasant and cooperative with the examination  HEENT: Normocephalic/atraumatic, sinuses nontender with palpation, TMs clear, nares patent with pink mucosa and clear rhinorrhea, oropharynx clear  Neck: No JVD or bruits, no adenopathy  Cor: Regular rate and " rhythm without murmur gallop or rub  Lungs: Clear to auscultation with equal breath sounds bilaterally. No wheezes, rhonchi.  Abdomen: Soft nontender without hepatosplenomegaly or masses appreciated, normoactive bowel sounds  Extremities: No cyanosis, clubbing or edema       ASSESSMENT/Plan:  1. Cough, unspecified type  New concern, most likely bronchitis, discussed supportive care and increase hydration, medication sent to pharmacy, if no improvement patient to come back for further assessment  amoxicillin-clavulanate (AUGMENTIN) 875-125 MG Tab    methylPREDNISolone (MEDROL DOSEPAK) 4 MG Tablet Therapy Pack    albuterol 108 (90 Base) MCG/ACT Aero Soln inhalation aerosol    guaifenesin-codeine (ROBITUSSIN AC) Solution oral solution      Please note that this dictation was created using voice recognition software. I have worked with consultants from the vendor as well as technical experts from [facility] to optimize the interface. I have made every reasonable attempt to correct obvious errors, but I expect that there are errors of grammar and possibly content that I did not discover before finalizing the note.

## 2023-04-24 DIAGNOSIS — Z11.1 SCREENING-PULMONARY TB: ICD-10-CM

## 2023-05-24 ENCOUNTER — HOSPITAL ENCOUNTER (OUTPATIENT)
Dept: LAB | Facility: MEDICAL CENTER | Age: 43
End: 2023-05-24
Attending: PHYSICIAN ASSISTANT
Payer: COMMERCIAL

## 2023-05-24 DIAGNOSIS — Z11.1 SCREENING-PULMONARY TB: ICD-10-CM

## 2023-05-24 PROCEDURE — 36415 COLL VENOUS BLD VENIPUNCTURE: CPT

## 2023-05-24 PROCEDURE — 86480 TB TEST CELL IMMUN MEASURE: CPT

## 2023-05-26 LAB
GAMMA INTERFERON BACKGROUND BLD IA-ACNC: 0.05 IU/ML
M TB IFN-G BLD-IMP: NEGATIVE
M TB IFN-G CD4+ BCKGRND COR BLD-ACNC: -0.01 IU/ML
MITOGEN IGNF BCKGRD COR BLD-ACNC: >10 IU/ML
QFT TB2 - NIL TBQ2: -0.01 IU/ML

## 2023-06-25 DIAGNOSIS — G43.109 MIGRAINE WITH AURA AND WITHOUT STATUS MIGRAINOSUS, NOT INTRACTABLE: ICD-10-CM

## 2023-06-26 RX ORDER — RIZATRIPTAN BENZOATE 10 MG/1
10 TABLET ORAL
Qty: 10 TABLET | Refills: 3 | Status: SHIPPED | OUTPATIENT
Start: 2023-06-26

## 2023-06-27 NOTE — ADDENDUM NOTE
Addended by: KATHY BARKSDALE on: 11/13/2019 07:07 PM     Modules accepted: Orders, SmartSet     This document is complete and the patient is ready for discharge.

## 2023-07-13 ENCOUNTER — OFFICE VISIT (OUTPATIENT)
Dept: MEDICAL GROUP | Facility: LAB | Age: 43
End: 2023-07-13
Payer: COMMERCIAL

## 2023-07-13 VITALS
RESPIRATION RATE: 12 BRPM | DIASTOLIC BLOOD PRESSURE: 88 MMHG | WEIGHT: 181.4 LBS | TEMPERATURE: 97.1 F | SYSTOLIC BLOOD PRESSURE: 126 MMHG | BODY MASS INDEX: 29.15 KG/M2 | HEART RATE: 100 BPM | OXYGEN SATURATION: 96 % | HEIGHT: 66 IN

## 2023-07-13 DIAGNOSIS — F32.1 MODERATE SINGLE CURRENT EPISODE OF MAJOR DEPRESSIVE DISORDER (HCC): ICD-10-CM

## 2023-07-13 DIAGNOSIS — Z11.59 ENCOUNTER FOR HEPATITIS C SCREENING TEST FOR LOW RISK PATIENT: ICD-10-CM

## 2023-07-13 DIAGNOSIS — Z00.00 WELL ADULT EXAM: ICD-10-CM

## 2023-07-13 DIAGNOSIS — Z23 NEED FOR VACCINATION: ICD-10-CM

## 2023-07-13 DIAGNOSIS — R23.2 HOT FLASHES: ICD-10-CM

## 2023-07-13 PROCEDURE — 90715 TDAP VACCINE 7 YRS/> IM: CPT | Performed by: FAMILY MEDICINE

## 2023-07-13 PROCEDURE — 99214 OFFICE O/P EST MOD 30 MIN: CPT | Mod: 25 | Performed by: FAMILY MEDICINE

## 2023-07-13 PROCEDURE — 3079F DIAST BP 80-89 MM HG: CPT | Performed by: FAMILY MEDICINE

## 2023-07-13 PROCEDURE — 90471 IMMUNIZATION ADMIN: CPT | Performed by: FAMILY MEDICINE

## 2023-07-13 PROCEDURE — 3074F SYST BP LT 130 MM HG: CPT | Performed by: FAMILY MEDICINE

## 2023-07-13 RX ORDER — VENLAFAXINE HYDROCHLORIDE 150 MG/1
150 CAPSULE, EXTENDED RELEASE ORAL DAILY
Qty: 90 CAPSULE | Refills: 3 | Status: SHIPPED | OUTPATIENT
Start: 2023-07-13

## 2023-07-13 RX ORDER — PAROXETINE 10 MG/1
10 TABLET, FILM COATED ORAL DAILY
COMMUNITY
End: 2023-07-13

## 2023-07-13 ASSESSMENT — PATIENT HEALTH QUESTIONNAIRE - PHQ9
3. TROUBLE FALLING OR STAYING ASLEEP OR SLEEPING TOO MUCH: NEARLY EVERY DAY
2. FEELING DOWN, DEPRESSED, IRRITABLE, OR HOPELESS: SEVERAL DAYS
9. THOUGHTS THAT YOU WOULD BE BETTER OFF DEAD, OR OF HURTING YOURSELF: NOT AT ALL
6. FEELING BAD ABOUT YOURSELF - OR THAT YOU ARE A FAILURE OR HAVE LET YOURSELF OR YOUR FAMILY DOWN: MORE THAN HALF THE DAYS
SUM OF ALL RESPONSES TO PHQ QUESTIONS 1-9: 10
8. MOVING OR SPEAKING SO SLOWLY THAT OTHER PEOPLE COULD HAVE NOTICED. OR THE OPPOSITE, BEING SO FIGETY OR RESTLESS THAT YOU HAVE BEEN MOVING AROUND A LOT MORE THAN USUAL: NOT AT ALL
7. TROUBLE CONCENTRATING ON THINGS, SUCH AS READING THE NEWSPAPER OR WATCHING TELEVISION: NOT AT ALL
1. LITTLE INTEREST OR PLEASURE IN DOING THINGS: SEVERAL DAYS
4. FEELING TIRED OR HAVING LITTLE ENERGY: MORE THAN HALF THE DAYS
5. POOR APPETITE OR OVEREATING: SEVERAL DAYS
SUM OF ALL RESPONSES TO PHQ9 QUESTIONS 1 AND 2: 2

## 2023-07-13 ASSESSMENT — FIBROSIS 4 INDEX: FIB4 SCORE: 0.61

## 2023-07-13 NOTE — PROGRESS NOTES
"Subjective:     CC: Mood, hot flashes, fatigue    HPI:   Amber presents today to discuss multiple concerns including mood, hot flashes, and fatigue.    Night and daytime hot flashes continue along with persistent night sweats.  Discussed with her OB and they had added Paxil on top of her daily Effexor 75 mg.  Has been having significant fatigue since then, no change in hot flashes.  She does have a Mirena in place so not sure if she is still having menstrual periods but does have occasional breakthrough bleeding.  When Effexor was started she did see initial improvement in mood but low mood symptoms are starting to increase again.    Medications, past medical history, allergies, and social history have been reviewed and updated.      Objective:       Exam:  /88 (BP Location: Left arm, Patient Position: Sitting, BP Cuff Size: Adult)   Pulse 100   Temp 36.2 °C (97.1 °F)   Resp 12   Ht 1.676 m (5' 6\")   Wt 82.3 kg (181 lb 6.4 oz)   SpO2 96%   BMI 29.28 kg/m²  Body mass index is 29.28 kg/m².    Constitutional: Alert. Well appearing. No distress.  Skin: Warm, dry, good turgor, no visible rashes.  ENMT: Moist mucous membranes. Normal dentition.  Respiratory: Normal effort. Lungs are clear to auscultation bilaterally.  Cardiovascular: Regular rate and rhythm. Normal S1/S2. No murmurs, rubs or gallops.   Neuro: Moves all four extremities. No facial droop.  Psych: Answers questions appropriately. Normal affect and mood.      Assessment & Plan:     43 y.o. female with the following -     1. Moderate single current episode of major depressive disorder (HCC)  Depression symptoms recently worsened, increase Effexor to 150 mg daily.  Recheck in 1 to 2 months.  - venlafaxine (EFFEXOR-XR) 150 MG extended-release capsule; Take 1 Capsule by mouth every day.  Dispense: 90 Capsule; Refill: 3    2. Hot flashes  Likely perimenopausal, has Mirena and generally does not get periods but will have occasional breakthrough " bleeding.  OB had recently put her on Paxil for this but stopping this today and discussed that should not be using SSRI and SNRI at same time.  Increasing Effexor dose as above.  If this is not effective could consider starting HRT, she does not have strict contraindications.    3. Encounter for hepatitis C screening test for low risk patient  - HEP C VIRUS ANTIBODY; Future    4. Well adult exam  - CBC WITH DIFFERENTIAL; Future  - Comp Metabolic Panel; Future  - Lipid Profile; Future  - HEMOGLOBIN A1C; Future  - TSH WITH REFLEX TO FT4; Future  - VITAMIN D,25 HYDROXY (DEFICIENCY); Future    5. Need for vaccination  - Tdap Vaccine =>6YO IM      Please note that this note was created using voice recognition software.

## 2023-07-15 ENCOUNTER — HOSPITAL ENCOUNTER (OUTPATIENT)
Dept: LAB | Facility: MEDICAL CENTER | Age: 43
End: 2023-07-15
Attending: FAMILY MEDICINE
Payer: COMMERCIAL

## 2023-07-15 DIAGNOSIS — Z11.59 ENCOUNTER FOR HEPATITIS C SCREENING TEST FOR LOW RISK PATIENT: ICD-10-CM

## 2023-07-15 DIAGNOSIS — Z00.00 WELL ADULT EXAM: ICD-10-CM

## 2023-07-15 LAB
25(OH)D3 SERPL-MCNC: 23 NG/ML (ref 30–100)
ALBUMIN SERPL BCP-MCNC: 4.3 G/DL (ref 3.2–4.9)
ALBUMIN/GLOB SERPL: 1.6 G/DL
ALP SERPL-CCNC: 59 U/L (ref 30–99)
ALT SERPL-CCNC: 19 U/L (ref 2–50)
ANION GAP SERPL CALC-SCNC: 9 MMOL/L (ref 7–16)
AST SERPL-CCNC: 13 U/L (ref 12–45)
BASOPHILS # BLD AUTO: 0.9 % (ref 0–1.8)
BASOPHILS # BLD: 0.06 K/UL (ref 0–0.12)
BILIRUB SERPL-MCNC: 0.4 MG/DL (ref 0.1–1.5)
BUN SERPL-MCNC: 13 MG/DL (ref 8–22)
CALCIUM ALBUM COR SERPL-MCNC: 8.9 MG/DL (ref 8.5–10.5)
CALCIUM SERPL-MCNC: 9.1 MG/DL (ref 8.5–10.5)
CHLORIDE SERPL-SCNC: 102 MMOL/L (ref 96–112)
CHOLEST SERPL-MCNC: 167 MG/DL (ref 100–199)
CO2 SERPL-SCNC: 27 MMOL/L (ref 20–33)
CREAT SERPL-MCNC: 0.57 MG/DL (ref 0.5–1.4)
EOSINOPHIL # BLD AUTO: 0.27 K/UL (ref 0–0.51)
EOSINOPHIL NFR BLD: 3.9 % (ref 0–6.9)
ERYTHROCYTE [DISTWIDTH] IN BLOOD BY AUTOMATED COUNT: 41.7 FL (ref 35.9–50)
EST. AVERAGE GLUCOSE BLD GHB EST-MCNC: 100 MG/DL
FASTING STATUS PATIENT QL REPORTED: NORMAL
GFR SERPLBLD CREATININE-BSD FMLA CKD-EPI: 115 ML/MIN/1.73 M 2
GLOBULIN SER CALC-MCNC: 2.7 G/DL (ref 1.9–3.5)
GLUCOSE SERPL-MCNC: 92 MG/DL (ref 65–99)
HBA1C MFR BLD: 5.1 % (ref 4–5.6)
HCT VFR BLD AUTO: 42.3 % (ref 37–47)
HCV AB SER QL: NORMAL
HDLC SERPL-MCNC: 35 MG/DL
HGB BLD-MCNC: 14.4 G/DL (ref 12–16)
IMM GRANULOCYTES # BLD AUTO: 0.02 K/UL (ref 0–0.11)
IMM GRANULOCYTES NFR BLD AUTO: 0.3 % (ref 0–0.9)
LDLC SERPL CALC-MCNC: 113 MG/DL
LYMPHOCYTES # BLD AUTO: 1.39 K/UL (ref 1–4.8)
LYMPHOCYTES NFR BLD: 20.2 % (ref 22–41)
MCH RBC QN AUTO: 31.7 PG (ref 27–33)
MCHC RBC AUTO-ENTMCNC: 34 G/DL (ref 32.2–35.5)
MCV RBC AUTO: 93.2 FL (ref 81.4–97.8)
MONOCYTES # BLD AUTO: 0.8 K/UL (ref 0–0.85)
MONOCYTES NFR BLD AUTO: 11.6 % (ref 0–13.4)
NEUTROPHILS # BLD AUTO: 4.35 K/UL (ref 1.82–7.42)
NEUTROPHILS NFR BLD: 63.1 % (ref 44–72)
NRBC # BLD AUTO: 0 K/UL
NRBC BLD-RTO: 0 /100 WBC (ref 0–0.2)
PLATELET # BLD AUTO: 279 K/UL (ref 164–446)
PMV BLD AUTO: 10 FL (ref 9–12.9)
POTASSIUM SERPL-SCNC: 3.9 MMOL/L (ref 3.6–5.5)
PROT SERPL-MCNC: 7 G/DL (ref 6–8.2)
RBC # BLD AUTO: 4.54 M/UL (ref 4.2–5.4)
SODIUM SERPL-SCNC: 138 MMOL/L (ref 135–145)
TRIGL SERPL-MCNC: 94 MG/DL (ref 0–149)
TSH SERPL DL<=0.005 MIU/L-ACNC: 0.79 UIU/ML (ref 0.38–5.33)
WBC # BLD AUTO: 6.9 K/UL (ref 4.8–10.8)

## 2023-07-15 PROCEDURE — 85025 COMPLETE CBC W/AUTO DIFF WBC: CPT

## 2023-07-15 PROCEDURE — 80061 LIPID PANEL: CPT

## 2023-07-15 PROCEDURE — 84443 ASSAY THYROID STIM HORMONE: CPT

## 2023-07-15 PROCEDURE — 86803 HEPATITIS C AB TEST: CPT

## 2023-07-15 PROCEDURE — 36415 COLL VENOUS BLD VENIPUNCTURE: CPT

## 2023-07-15 PROCEDURE — 82306 VITAMIN D 25 HYDROXY: CPT

## 2023-07-15 PROCEDURE — 80053 COMPREHEN METABOLIC PANEL: CPT

## 2023-07-15 PROCEDURE — 83036 HEMOGLOBIN GLYCOSYLATED A1C: CPT

## 2023-10-31 ENCOUNTER — TELEPHONE (OUTPATIENT)
Dept: MEDICAL GROUP | Facility: LAB | Age: 43
End: 2023-10-31
Payer: COMMERCIAL

## 2023-12-27 ENCOUNTER — OFFICE VISIT (OUTPATIENT)
Dept: MEDICAL GROUP | Facility: LAB | Age: 43
End: 2023-12-27
Payer: COMMERCIAL

## 2023-12-27 VITALS
TEMPERATURE: 97.5 F | HEART RATE: 86 BPM | OXYGEN SATURATION: 99 % | BODY MASS INDEX: 28.93 KG/M2 | HEIGHT: 66 IN | DIASTOLIC BLOOD PRESSURE: 82 MMHG | RESPIRATION RATE: 12 BRPM | WEIGHT: 180 LBS | SYSTOLIC BLOOD PRESSURE: 130 MMHG

## 2023-12-27 DIAGNOSIS — J02.0 STREP PHARYNGITIS: ICD-10-CM

## 2023-12-27 LAB
FLUAV RNA SPEC QL NAA+PROBE: NEGATIVE
FLUBV RNA SPEC QL NAA+PROBE: NEGATIVE
RSV RNA SPEC QL NAA+PROBE: NEGATIVE
S PYO DNA SPEC NAA+PROBE: DETECTED
SARS-COV-2 RNA RESP QL NAA+PROBE: NEGATIVE

## 2023-12-27 PROCEDURE — 99214 OFFICE O/P EST MOD 30 MIN: CPT | Performed by: FAMILY MEDICINE

## 2023-12-27 PROCEDURE — 3075F SYST BP GE 130 - 139MM HG: CPT | Performed by: FAMILY MEDICINE

## 2023-12-27 PROCEDURE — 3079F DIAST BP 80-89 MM HG: CPT | Performed by: FAMILY MEDICINE

## 2023-12-27 PROCEDURE — 87651 STREP A DNA AMP PROBE: CPT | Performed by: FAMILY MEDICINE

## 2023-12-27 PROCEDURE — 0241U POCT CEPHEID COV-2, FLU A/B, RSV - PCR: CPT | Performed by: FAMILY MEDICINE

## 2023-12-27 RX ORDER — AMOXICILLIN 500 MG/1
500 CAPSULE ORAL 2 TIMES DAILY
Qty: 20 CAPSULE | Refills: 0 | Status: SHIPPED | OUTPATIENT
Start: 2023-12-27 | End: 2024-01-06

## 2023-12-27 ASSESSMENT — FIBROSIS 4 INDEX: FIB4 SCORE: 0.46

## 2024-04-02 ENCOUNTER — APPOINTMENT (OUTPATIENT)
Dept: MEDICAL GROUP | Facility: LAB | Age: 44
End: 2024-04-02
Payer: COMMERCIAL

## 2024-04-02 DIAGNOSIS — E55.9 VITAMIN D DEFICIENCY: ICD-10-CM

## 2024-04-02 DIAGNOSIS — R23.2 HOT FLASHES: ICD-10-CM

## 2024-04-02 DIAGNOSIS — Z00.00 WELL ADULT EXAM: ICD-10-CM

## 2024-04-03 ENCOUNTER — APPOINTMENT (OUTPATIENT)
Dept: MEDICAL GROUP | Facility: LAB | Age: 44
End: 2024-04-03
Payer: COMMERCIAL

## 2024-04-22 ENCOUNTER — HOSPITAL ENCOUNTER (OUTPATIENT)
Dept: LAB | Facility: MEDICAL CENTER | Age: 44
End: 2024-04-22
Attending: FAMILY MEDICINE
Payer: COMMERCIAL

## 2024-04-22 DIAGNOSIS — Z00.00 WELL ADULT EXAM: ICD-10-CM

## 2024-04-22 DIAGNOSIS — R23.2 HOT FLASHES: ICD-10-CM

## 2024-04-22 DIAGNOSIS — E55.9 VITAMIN D DEFICIENCY: ICD-10-CM

## 2024-04-22 LAB
25(OH)D3 SERPL-MCNC: 27 NG/ML (ref 30–100)
ALBUMIN SERPL BCP-MCNC: 4.3 G/DL (ref 3.2–4.9)
ALBUMIN/GLOB SERPL: 1.4 G/DL
ALP SERPL-CCNC: 53 U/L (ref 30–99)
ALT SERPL-CCNC: 18 U/L (ref 2–50)
ANION GAP SERPL CALC-SCNC: 9 MMOL/L (ref 7–16)
AST SERPL-CCNC: 18 U/L (ref 12–45)
BASOPHILS # BLD AUTO: 0.7 % (ref 0–1.8)
BASOPHILS # BLD: 0.06 K/UL (ref 0–0.12)
BILIRUB SERPL-MCNC: 0.3 MG/DL (ref 0.1–1.5)
BUN SERPL-MCNC: 12 MG/DL (ref 8–22)
CALCIUM ALBUM COR SERPL-MCNC: 8.8 MG/DL (ref 8.5–10.5)
CALCIUM SERPL-MCNC: 9 MG/DL (ref 8.5–10.5)
CHLORIDE SERPL-SCNC: 105 MMOL/L (ref 96–112)
CHOLEST SERPL-MCNC: 208 MG/DL (ref 100–199)
CO2 SERPL-SCNC: 25 MMOL/L (ref 20–33)
CREAT SERPL-MCNC: 0.62 MG/DL (ref 0.5–1.4)
EOSINOPHIL # BLD AUTO: 0.25 K/UL (ref 0–0.51)
EOSINOPHIL NFR BLD: 2.9 % (ref 0–6.9)
ERYTHROCYTE [DISTWIDTH] IN BLOOD BY AUTOMATED COUNT: 39 FL (ref 35.9–50)
FASTING STATUS PATIENT QL REPORTED: NORMAL
FSH SERPL-ACNC: 4.5 MIU/ML
GFR SERPLBLD CREATININE-BSD FMLA CKD-EPI: 113 ML/MIN/1.73 M 2
GLOBULIN SER CALC-MCNC: 3 G/DL (ref 1.9–3.5)
GLUCOSE SERPL-MCNC: 85 MG/DL (ref 65–99)
HCT VFR BLD AUTO: 43.9 % (ref 37–47)
HDLC SERPL-MCNC: 37 MG/DL
HGB BLD-MCNC: 15 G/DL (ref 12–16)
IMM GRANULOCYTES # BLD AUTO: 0.02 K/UL (ref 0–0.11)
IMM GRANULOCYTES NFR BLD AUTO: 0.2 % (ref 0–0.9)
LDLC SERPL CALC-MCNC: 144 MG/DL
LYMPHOCYTES # BLD AUTO: 2.29 K/UL (ref 1–4.8)
LYMPHOCYTES NFR BLD: 26.4 % (ref 22–41)
MCH RBC QN AUTO: 30.9 PG (ref 27–33)
MCHC RBC AUTO-ENTMCNC: 34.2 G/DL (ref 32.2–35.5)
MCV RBC AUTO: 90.3 FL (ref 81.4–97.8)
MONOCYTES # BLD AUTO: 0.6 K/UL (ref 0–0.85)
MONOCYTES NFR BLD AUTO: 6.9 % (ref 0–13.4)
NEUTROPHILS # BLD AUTO: 5.47 K/UL (ref 1.82–7.42)
NEUTROPHILS NFR BLD: 62.9 % (ref 44–72)
NRBC # BLD AUTO: 0 K/UL
NRBC BLD-RTO: 0 /100 WBC (ref 0–0.2)
PLATELET # BLD AUTO: 273 K/UL (ref 164–446)
PMV BLD AUTO: 9.8 FL (ref 9–12.9)
POTASSIUM SERPL-SCNC: 4.1 MMOL/L (ref 3.6–5.5)
PROT SERPL-MCNC: 7.3 G/DL (ref 6–8.2)
RBC # BLD AUTO: 4.86 M/UL (ref 4.2–5.4)
SODIUM SERPL-SCNC: 139 MMOL/L (ref 135–145)
TRIGL SERPL-MCNC: 134 MG/DL (ref 0–149)
TSH SERPL DL<=0.005 MIU/L-ACNC: 1.64 UIU/ML (ref 0.38–5.33)
WBC # BLD AUTO: 8.7 K/UL (ref 4.8–10.8)

## 2024-04-22 PROCEDURE — 36415 COLL VENOUS BLD VENIPUNCTURE: CPT

## 2024-04-22 PROCEDURE — 84443 ASSAY THYROID STIM HORMONE: CPT

## 2024-04-22 PROCEDURE — 80053 COMPREHEN METABOLIC PANEL: CPT

## 2024-04-22 PROCEDURE — 83001 ASSAY OF GONADOTROPIN (FSH): CPT

## 2024-04-22 PROCEDURE — 85025 COMPLETE CBC W/AUTO DIFF WBC: CPT

## 2024-04-22 PROCEDURE — 82306 VITAMIN D 25 HYDROXY: CPT

## 2024-04-22 PROCEDURE — 80061 LIPID PANEL: CPT

## 2024-04-24 ENCOUNTER — OFFICE VISIT (OUTPATIENT)
Dept: MEDICAL GROUP | Facility: LAB | Age: 44
End: 2024-04-24
Payer: COMMERCIAL

## 2024-04-24 VITALS
WEIGHT: 188 LBS | OXYGEN SATURATION: 97 % | RESPIRATION RATE: 16 BRPM | HEIGHT: 66 IN | BODY MASS INDEX: 30.22 KG/M2 | SYSTOLIC BLOOD PRESSURE: 156 MMHG | TEMPERATURE: 97.7 F | DIASTOLIC BLOOD PRESSURE: 98 MMHG | HEART RATE: 93 BPM

## 2024-04-24 DIAGNOSIS — R23.2 HOT FLASHES: ICD-10-CM

## 2024-04-24 DIAGNOSIS — E55.9 VITAMIN D DEFICIENCY: ICD-10-CM

## 2024-04-24 DIAGNOSIS — R03.0 ELEVATED BLOOD PRESSURE READING: ICD-10-CM

## 2024-04-24 DIAGNOSIS — R63.5 WEIGHT GAIN: ICD-10-CM

## 2024-04-24 DIAGNOSIS — G56.02 CARPAL TUNNEL SYNDROME OF LEFT WRIST: ICD-10-CM

## 2024-04-24 PROCEDURE — 3077F SYST BP >= 140 MM HG: CPT | Performed by: FAMILY MEDICINE

## 2024-04-24 PROCEDURE — 99214 OFFICE O/P EST MOD 30 MIN: CPT | Performed by: FAMILY MEDICINE

## 2024-04-24 PROCEDURE — 3080F DIAST BP >= 90 MM HG: CPT | Performed by: FAMILY MEDICINE

## 2024-04-24 ASSESSMENT — FIBROSIS 4 INDEX: FIB4 SCORE: 0.67

## 2024-04-24 ASSESSMENT — PATIENT HEALTH QUESTIONNAIRE - PHQ9: CLINICAL INTERPRETATION OF PHQ2 SCORE: 0

## 2024-04-24 NOTE — PROGRESS NOTES
"Subjective:     CC: Flashes, weight gain, hand swelling/numbness.    HPI:   Amber presents today with multiple concerns.    Persistent and severe hot flashes going on for 6+ months.  Severe night sweats with this as well.  Has not had periods for around 10 years since starting Mirena, which was replaced about 5 years ago.  Recent normal TSH, FSH in low normal range.    Continues to gain weight despite being relatively active, does report could back on simple sugars/sweets.    Noticing that hands are swollen waking up and having some numbness upon awakening, especially to the left hand.  Notes this in fingers 2-4.    Medications, past medical history, allergies, and social history have been reviewed and updated.      Objective:       Exam:  BP (!) 156/98   Pulse 93   Temp 36.5 °C (97.7 °F) (Temporal)   Resp 16   Ht 1.676 m (5' 6\")   Wt 85.3 kg (188 lb)   SpO2 97%   BMI 30.34 kg/m²  Body mass index is 30.34 kg/m².    Constitutional: Alert. Well appearing. No distress.  Skin: Warm, dry, good turgor, no visible rashes.  ENMT: Moist mucous membranes. Normal dentition.  Respiratory: Normal effort. Lungs are clear to auscultation bilaterally.  Cardiovascular: Regular rate and rhythm. Normal S1/S2. No murmurs, rubs or gallops.  Radial pulses 2+ and symmetric.  MSK: Numbness to fingers 2-3 in the left hand is reproduced with Phalen's test.  Negative Tinel's test.  Strength intact of both upper extremities and hands.  Thumb opposition strength intact bilaterally.  Possible mild thenar wasting over the left hand.  No reproduction of symptoms with palpation over the cubital tunnels.  Neuro: Moves all four extremities. No facial droop.  Psych: Answers questions appropriately. Normal affect and mood.    Assessment & Plan:     43 y.o. female with the following -     1. Hot flashes  Persistent and severe along with night sweats.  I think likely perimenopausal but tough to differentiate as she has not had periods with some " time with Mirena.  FSH was in normal range.  TSH normal.  We discussed possible options including trialing HRT and monitoring for symptom improvement.  She is planning on establishing with OB/GYN and will discuss further with them.  At this point I do not think this requires additional workup for infectious or inflammatory causes but could consider additional workup including inflammatory markers in the future.    2. Weight gain  Discussed diet and exercise changes including trying to limit calorie rich foods and simple carbohydrates.    3. Elevated blood pressure reading  156/98 today.  No prior diagnosis of hypertension.  Hold on meds and discussed lifestyle measures.  Close follow-up for recheck.    4. Carpal tunnel syndrome of left wrist  Discussed trying nocturnal splinting, consider further workup if not improving.    5. Vitamin D deficiency  Start 2000 units daily.    My total time spent caring for the patient on the day of the encounter was >30 minutes.   This does not include time spent on separately billable procedures/tests.      Please note that this note was created using voice recognition software.

## 2024-05-21 ENCOUNTER — HOSPITAL ENCOUNTER (EMERGENCY)
Facility: MEDICAL CENTER | Age: 44
End: 2024-05-21
Attending: EMERGENCY MEDICINE
Payer: COMMERCIAL

## 2024-05-21 ENCOUNTER — PATIENT MESSAGE (OUTPATIENT)
Dept: MEDICAL GROUP | Facility: LAB | Age: 44
End: 2024-05-21
Payer: COMMERCIAL

## 2024-05-21 VITALS
WEIGHT: 176.59 LBS | DIASTOLIC BLOOD PRESSURE: 89 MMHG | BODY MASS INDEX: 28.38 KG/M2 | SYSTOLIC BLOOD PRESSURE: 142 MMHG | HEIGHT: 66 IN | OXYGEN SATURATION: 98 % | TEMPERATURE: 97.1 F | RESPIRATION RATE: 16 BRPM | HEART RATE: 84 BPM

## 2024-05-21 DIAGNOSIS — Z86.69 HISTORY OF MIGRAINE: ICD-10-CM

## 2024-05-21 DIAGNOSIS — R11.2 NAUSEA AND VOMITING, UNSPECIFIED VOMITING TYPE: ICD-10-CM

## 2024-05-21 DIAGNOSIS — E86.0 DEHYDRATION: ICD-10-CM

## 2024-05-21 LAB
ALBUMIN SERPL BCP-MCNC: 4.6 G/DL (ref 3.2–4.9)
ALBUMIN/GLOB SERPL: 1.4 G/DL
ALP SERPL-CCNC: 59 U/L (ref 30–99)
ALT SERPL-CCNC: 13 U/L (ref 2–50)
ANION GAP SERPL CALC-SCNC: 13 MMOL/L (ref 7–16)
APPEARANCE UR: CLEAR
AST SERPL-CCNC: 14 U/L (ref 12–45)
BACTERIA #/AREA URNS HPF: ABNORMAL /HPF
BASOPHILS # BLD AUTO: 0.4 % (ref 0–1.8)
BASOPHILS # BLD: 0.04 K/UL (ref 0–0.12)
BILIRUB SERPL-MCNC: 0.5 MG/DL (ref 0.1–1.5)
BILIRUB UR QL STRIP.AUTO: ABNORMAL
BUN SERPL-MCNC: 12 MG/DL (ref 8–22)
CALCIUM ALBUM COR SERPL-MCNC: 9.1 MG/DL (ref 8.5–10.5)
CALCIUM SERPL-MCNC: 9.6 MG/DL (ref 8.4–10.2)
CHLORIDE SERPL-SCNC: 101 MMOL/L (ref 96–112)
CO2 SERPL-SCNC: 23 MMOL/L (ref 20–33)
COLOR UR: YELLOW
CREAT SERPL-MCNC: 0.54 MG/DL (ref 0.5–1.4)
EOSINOPHIL # BLD AUTO: 0.12 K/UL (ref 0–0.51)
EOSINOPHIL NFR BLD: 1.3 % (ref 0–6.9)
EPI CELLS #/AREA URNS HPF: ABNORMAL /HPF
ERYTHROCYTE [DISTWIDTH] IN BLOOD BY AUTOMATED COUNT: 39.3 FL (ref 35.9–50)
GFR SERPLBLD CREATININE-BSD FMLA CKD-EPI: 116 ML/MIN/1.73 M 2
GLOBULIN SER CALC-MCNC: 3.4 G/DL (ref 1.9–3.5)
GLUCOSE SERPL-MCNC: 94 MG/DL (ref 65–99)
GLUCOSE UR STRIP.AUTO-MCNC: NEGATIVE MG/DL
HCG SERPL QL: NEGATIVE
HCT VFR BLD AUTO: 49.3 % (ref 37–47)
HGB BLD-MCNC: 17 G/DL (ref 12–16)
IMM GRANULOCYTES # BLD AUTO: 0.01 K/UL (ref 0–0.11)
IMM GRANULOCYTES NFR BLD AUTO: 0.1 % (ref 0–0.9)
KETONES UR STRIP.AUTO-MCNC: 40 MG/DL
LEUKOCYTE ESTERASE UR QL STRIP.AUTO: NEGATIVE
LIPASE SERPL-CCNC: 36 U/L (ref 11–82)
LYMPHOCYTES # BLD AUTO: 1.74 K/UL (ref 1–4.8)
LYMPHOCYTES NFR BLD: 18.7 % (ref 22–41)
MCH RBC QN AUTO: 31.1 PG (ref 27–33)
MCHC RBC AUTO-ENTMCNC: 34.5 G/DL (ref 32.2–35.5)
MCV RBC AUTO: 90.1 FL (ref 81.4–97.8)
MICRO URNS: ABNORMAL
MONOCYTES # BLD AUTO: 0.57 K/UL (ref 0–0.85)
MONOCYTES NFR BLD AUTO: 6.1 % (ref 0–13.4)
MUCOUS THREADS #/AREA URNS HPF: ABNORMAL /HPF
NEUTROPHILS # BLD AUTO: 6.83 K/UL (ref 1.82–7.42)
NEUTROPHILS NFR BLD: 73.4 % (ref 44–72)
NITRITE UR QL STRIP.AUTO: NEGATIVE
NRBC # BLD AUTO: 0 K/UL
NRBC BLD-RTO: 0 /100 WBC (ref 0–0.2)
PH UR STRIP.AUTO: 6 [PH] (ref 5–8)
PLATELET # BLD AUTO: 303 K/UL (ref 164–446)
PMV BLD AUTO: 9.7 FL (ref 9–12.9)
POTASSIUM SERPL-SCNC: 3.7 MMOL/L (ref 3.6–5.5)
PROT SERPL-MCNC: 8 G/DL (ref 6–8.2)
PROT UR QL STRIP: NEGATIVE MG/DL
RBC # BLD AUTO: 5.47 M/UL (ref 4.2–5.4)
RBC # URNS HPF: ABNORMAL /HPF
RBC UR QL AUTO: ABNORMAL
SODIUM SERPL-SCNC: 137 MMOL/L (ref 135–145)
SP GR UR STRIP.AUTO: 1.02
WBC # BLD AUTO: 9.3 K/UL (ref 4.8–10.8)
WBC #/AREA URNS HPF: ABNORMAL /HPF

## 2024-05-21 RX ORDER — ONDANSETRON 2 MG/ML
4 INJECTION INTRAMUSCULAR; INTRAVENOUS ONCE
Status: COMPLETED | OUTPATIENT
Start: 2024-05-21 | End: 2024-05-21

## 2024-05-21 RX ORDER — SODIUM CHLORIDE 9 MG/ML
1000 INJECTION, SOLUTION INTRAVENOUS ONCE
Status: COMPLETED | OUTPATIENT
Start: 2024-05-21 | End: 2024-05-21

## 2024-05-21 RX ORDER — ONDANSETRON 4 MG/1
4 TABLET, ORALLY DISINTEGRATING ORAL EVERY 8 HOURS PRN
Qty: 10 TABLET | Refills: 1 | Status: SHIPPED | OUTPATIENT
Start: 2024-05-21

## 2024-05-21 RX ADMIN — SODIUM CHLORIDE 1000 ML: 9 INJECTION, SOLUTION INTRAVENOUS at 16:57

## 2024-05-21 RX ADMIN — ONDANSETRON 4 MG: 2 INJECTION INTRAMUSCULAR; INTRAVENOUS at 16:57

## 2024-05-21 ASSESSMENT — FIBROSIS 4 INDEX: FIB4 SCORE: 0.67

## 2024-05-21 NOTE — ED PROVIDER NOTES
"ED Provider Note    CHIEF COMPLAINT  Chief Complaint   Patient presents with    N/V     Started on Sunday after developing a migraine         EXTERNAL RECORDS REVIEWED  PDMP no active narcotic pain medication prescriptions    HPI/ROS  LIMITATION TO HISTORY   Select: : None  OUTSIDE HISTORIAN(S):  None    Amber Casey is a 43 y.o. female who presents with persistent nausea vomiting over the past 2 days.  She developed a migraine headache, something she states she has approximate 3 times a year, took her usual medicine for the headache on the way.  This time however, different from other headaches, the nausea was persistent.  She states \"it feels like the last time I was a pregnant, however my  had a vasectomy.\"  No abdominal pain.  No neck pain.  No fever or chills.  She states normal bowel movements.  No dysuria or flank pain.    PAST MEDICAL HISTORY   has a past medical history of Anxiety, Depression, and Migraine.    SURGICAL HISTORY   has a past surgical history that includes tonsillectomy.    FAMILY HISTORY  Family History   Problem Relation Age of Onset    Psychiatric Illness Mother     Hypertension Mother     Hyperlipidemia Mother     Hypertension Father     Hyperlipidemia Father     Diabetes Paternal Grandmother        SOCIAL HISTORY  Social History     Tobacco Use    Smoking status: Never    Smokeless tobacco: Never    Tobacco comments:     denies   Vaping Use    Vaping status: Never Used   Substance and Sexual Activity    Alcohol use: Not Currently     Comment: denies    Drug use: No     Comment: denies    Sexual activity: Yes     Partners: Male     Birth control/protection: I.U.D.       CURRENT MEDICATIONS  Home Medications    **Home medications have not yet been reviewed for this encounter**         ALLERGIES  Allergies   Allergen Reactions    Phenothiazines      dystonia       PHYSICAL EXAM  VITAL SIGNS: BP (!) 150/108   Pulse (!) 102   Temp 35.9 °C (96.7 °F) (Temporal)   Resp 15   " "Ht 1.676 m (5' 6\")   Wt 80.1 kg (176 lb 9.4 oz)   SpO2 100%   BMI 28.50 kg/m²    Constitutional: Well-nourished  Respiratory: Clear lung sounds  Cardiac: Tachycardic rate, regular rhythm  GI: Abdomen is soft and nontender, no guarding, no distention  Musculoskeletal: No flank tenderness.  Neurologic: Strength normal.  Sensation intact.  Speech is clear  Psychiatric: Normal mood, normal affect    EKG/LABS  Results for orders placed or performed during the hospital encounter of 05/21/24   CBC WITH DIFFERENTIAL   Result Value Ref Range    WBC 9.3 4.8 - 10.8 K/uL    RBC 5.47 (H) 4.20 - 5.40 M/uL    Hemoglobin 17.0 (H) 12.0 - 16.0 g/dL    Hematocrit 49.3 (H) 37.0 - 47.0 %    MCV 90.1 81.4 - 97.8 fL    MCH 31.1 27.0 - 33.0 pg    MCHC 34.5 32.2 - 35.5 g/dL    RDW 39.3 35.9 - 50.0 fL    Platelet Count 303 164 - 446 K/uL    MPV 9.7 9.0 - 12.9 fL    Neutrophils-Polys 73.40 (H) 44.00 - 72.00 %    Lymphocytes 18.70 (L) 22.00 - 41.00 %    Monocytes 6.10 0.00 - 13.40 %    Eosinophils 1.30 0.00 - 6.90 %    Basophils 0.40 0.00 - 1.80 %    Immature Granulocytes 0.10 0.00 - 0.90 %    Nucleated RBC 0.00 0.00 - 0.20 /100 WBC    Neutrophils (Absolute) 6.83 1.82 - 7.42 K/uL    Lymphs (Absolute) 1.74 1.00 - 4.80 K/uL    Monos (Absolute) 0.57 0.00 - 0.85 K/uL    Eos (Absolute) 0.12 0.00 - 0.51 K/uL    Baso (Absolute) 0.04 0.00 - 0.12 K/uL    Immature Granulocytes (abs) 0.01 0.00 - 0.11 K/uL    NRBC (Absolute) 0.00 K/uL   COMP METABOLIC PANEL   Result Value Ref Range    Sodium 137 135 - 145 mmol/L    Potassium 3.7 3.6 - 5.5 mmol/L    Chloride 101 96 - 112 mmol/L    Co2 23 20 - 33 mmol/L    Anion Gap 13.0 7.0 - 16.0    Glucose 94 65 - 99 mg/dL    Bun 12 8 - 22 mg/dL    Creatinine 0.54 0.50 - 1.40 mg/dL    Calcium 9.6 8.4 - 10.2 mg/dL    Correct Calcium 9.1 8.5 - 10.5 mg/dL    AST(SGOT) 14 12 - 45 U/L    ALT(SGPT) 13 2 - 50 U/L    Alkaline Phosphatase 59 30 - 99 U/L    Total Bilirubin 0.5 0.1 - 1.5 mg/dL    Albumin 4.6 3.2 - 4.9 g/dL    " Total Protein 8.0 6.0 - 8.2 g/dL    Globulin 3.4 1.9 - 3.5 g/dL    A-G Ratio 1.4 g/dL   LIPASE   Result Value Ref Range    Lipase 36 11 - 82 U/L   URINALYSIS (UA)    Specimen: Urine   Result Value Ref Range    Color Yellow     Character Clear     Specific Gravity 1.025 <1.035    Ph 6.0 5.0 - 8.0    Glucose Negative Negative mg/dL    Ketones 40 (A) Negative mg/dL    Protein Negative Negative mg/dL    Bilirubin Small (A) Negative    Nitrite Negative Negative    Leukocyte Esterase Negative Negative    Occult Blood Trace (A) Negative    Micro Urine Req Microscopic    HCG QUAL SERUM   Result Value Ref Range    Beta-Hcg Qualitative Serum Negative Negative   URINE MICROSCOPIC (W/UA)   Result Value Ref Range    WBC 0-2 /hpf    RBC 2-5 (A) /hpf    Bacteria Few (A) None /hpf    Epithelial Cells Rare Few /hpf    Mucous Threads Many /hpf   ESTIMATED GFR   Result Value Ref Range    GFR (CKD-EPI) 116 >60 mL/min/1.73 m 2             COURSE & MEDICAL DECISION MAKING    ASSESSMENT, COURSE AND PLAN  Care Narrative: Patient presents with repetitive vomiting over the last 3 days.  It started as her usual migraine headache, treated with her usual medicine with resolution of headache.  However this time the nausea and vomiting has persisted.  She stated she felt similar to the last time she was pregnant, she has a negative serum pregnancy test today.  Urinalysis is negative for infection.  No acute electrolyte abnormalities.  Her liver and renal function are both normal.  No evidence of pancreatitis with normal lipase.  Etiology of her nausea vomiting is unknown, possible residual symptoms from her migraine.  Food poisoning or viral syndrome also considered.  She has no headache and no acute neurologic abnormalities, did not meet criteria for imaging of the head.  Her abdominal exam is benign and labs normal, no indication for ultrasound or CT scan of the abdomen.  Patient felt improved after Zofran, she is now able to drink water  without vomiting.  Vital signs also improved, initial hypertension near resolved and heart rate is normalized.  Suspect some of her discomfort to be related to dehydration from vomiting.  Plan to continue Zofran as needed, she is advised to return sooner if worse or for any concerns.  Hydration: Based on the patient's presentation of Acute Vomiting, Dehydration, and Tachycardia the patient was given IV fluids. IV Hydration was used because oral hydration was not adequate alone. Upon recheck following hydration, the patient was improved.          DISPOSITION AND DISCUSSIONS    Escalation of care considered, and ultimately not performed:diagnostic imaging, CT scan of the abdomen was considered however serial abdominal exam has been benign without tenderness    Barriers to care at this time, including but not limited to:  None .     Decision tools and prescription drugs considered including, but not limited to: Antibiotics were not indicated, no evidence of bacterial infection, negative urinalysis .    FINAL DIAGNOSIS  1. Nausea and vomiting, unspecified vomiting type    2. Dehydration    3. History of migraine           Electronically signed by: Kasi Callaway M.D., 5/21/2024 4:23 PM

## 2024-05-21 NOTE — Clinical Note
Amber Casey was seen and treated in our emergency department on 5/21/2024.  She may return to work on 05/24/2024.       If you have any questions or concerns, please don't hesitate to call.      Kasi Callaway M.D.

## 2024-05-21 NOTE — ED TRIAGE NOTES
"Chief Complaint   Patient presents with    N/V     Started on Sunday after developing a migraine       BP (!) 150/108   Pulse (!) 102   Temp 35.9 °C (96.7 °F) (Temporal)   Resp 15   Ht 1.676 m (5' 6\")   Wt 80.1 kg (176 lb 9.4 oz)   SpO2 100%   BMI 28.50 kg/m²     Pt ambulated to ED w/ visitor for c/o N/V started after developing a migraine, did take prescribed medication w/ relief, continues to have N/V.   "

## 2024-07-25 ENCOUNTER — APPOINTMENT (OUTPATIENT)
Dept: MEDICAL GROUP | Facility: LAB | Age: 44
End: 2024-07-25
Payer: COMMERCIAL

## 2024-07-25 DIAGNOSIS — F32.1 MODERATE SINGLE CURRENT EPISODE OF MAJOR DEPRESSIVE DISORDER (HCC): ICD-10-CM

## 2024-07-25 RX ORDER — VENLAFAXINE HYDROCHLORIDE 150 MG/1
150 CAPSULE, EXTENDED RELEASE ORAL DAILY
Qty: 90 CAPSULE | Refills: 3 | Status: SHIPPED | OUTPATIENT
Start: 2024-07-25

## 2024-09-15 DIAGNOSIS — G43.109 MIGRAINE WITH AURA AND WITHOUT STATUS MIGRAINOSUS, NOT INTRACTABLE: ICD-10-CM

## 2024-09-17 RX ORDER — RIZATRIPTAN BENZOATE 10 MG/1
10 TABLET ORAL
Qty: 10 TABLET | Refills: 3 | Status: SHIPPED | OUTPATIENT
Start: 2024-09-17

## 2024-10-15 ENCOUNTER — OFFICE VISIT (OUTPATIENT)
Dept: MEDICAL GROUP | Facility: LAB | Age: 44
End: 2024-10-15
Payer: COMMERCIAL

## 2024-10-15 VITALS
HEIGHT: 66 IN | BODY MASS INDEX: 30.08 KG/M2 | DIASTOLIC BLOOD PRESSURE: 98 MMHG | WEIGHT: 187.2 LBS | SYSTOLIC BLOOD PRESSURE: 180 MMHG | TEMPERATURE: 97.2 F | HEART RATE: 98 BPM | RESPIRATION RATE: 16 BRPM | OXYGEN SATURATION: 98 %

## 2024-10-15 DIAGNOSIS — I10 ESSENTIAL HYPERTENSION: ICD-10-CM

## 2024-10-15 DIAGNOSIS — R61 DIAPHORESIS: ICD-10-CM

## 2024-10-15 PROCEDURE — 3080F DIAST BP >= 90 MM HG: CPT | Performed by: FAMILY MEDICINE

## 2024-10-15 PROCEDURE — 99214 OFFICE O/P EST MOD 30 MIN: CPT | Performed by: FAMILY MEDICINE

## 2024-10-15 PROCEDURE — 3077F SYST BP >= 140 MM HG: CPT | Performed by: FAMILY MEDICINE

## 2024-10-15 RX ORDER — LOSARTAN POTASSIUM 50 MG/1
50 TABLET ORAL DAILY
Qty: 90 TABLET | Refills: 3 | Status: SHIPPED | OUTPATIENT
Start: 2024-10-15

## 2024-10-15 ASSESSMENT — PATIENT HEALTH QUESTIONNAIRE - PHQ9
3. TROUBLE FALLING OR STAYING ASLEEP OR SLEEPING TOO MUCH: NOT AT ALL
8. MOVING OR SPEAKING SO SLOWLY THAT OTHER PEOPLE COULD HAVE NOTICED. OR THE OPPOSITE, BEING SO FIGETY OR RESTLESS THAT YOU HAVE BEEN MOVING AROUND A LOT MORE THAN USUAL: NOT AT ALL
1. LITTLE INTEREST OR PLEASURE IN DOING THINGS: NOT AT ALL
SUM OF ALL RESPONSES TO PHQ9 QUESTIONS 1 AND 2: 0
SUM OF ALL RESPONSES TO PHQ QUESTIONS 1-9: 4
5. POOR APPETITE OR OVEREATING: NOT AT ALL
6. FEELING BAD ABOUT YOURSELF - OR THAT YOU ARE A FAILURE OR HAVE LET YOURSELF OR YOUR FAMILY DOWN: NOT AL ALL
7. TROUBLE CONCENTRATING ON THINGS, SUCH AS READING THE NEWSPAPER OR WATCHING TELEVISION: MORE THAN HALF THE DAYS
9. THOUGHTS THAT YOU WOULD BE BETTER OFF DEAD, OR OF HURTING YOURSELF: NOT AT ALL
2. FEELING DOWN, DEPRESSED, IRRITABLE, OR HOPELESS: NOT AT ALL
4. FEELING TIRED OR HAVING LITTLE ENERGY: MORE THAN HALF THE DAYS

## 2024-10-15 ASSESSMENT — FIBROSIS 4 INDEX: FIB4 SCORE: 0.56

## 2024-11-22 ENCOUNTER — HOSPITAL ENCOUNTER (OUTPATIENT)
Dept: LAB | Facility: MEDICAL CENTER | Age: 44
End: 2024-11-22
Attending: FAMILY MEDICINE
Payer: COMMERCIAL

## 2024-11-22 DIAGNOSIS — I10 ESSENTIAL HYPERTENSION: ICD-10-CM

## 2024-11-22 DIAGNOSIS — R61 DIAPHORESIS: ICD-10-CM

## 2024-11-22 LAB
ALBUMIN SERPL BCP-MCNC: 4.5 G/DL (ref 3.2–4.9)
ALBUMIN/GLOB SERPL: 1.6 G/DL
ALP SERPL-CCNC: 58 U/L (ref 30–99)
ALT SERPL-CCNC: 21 U/L (ref 2–50)
ANION GAP SERPL CALC-SCNC: 12 MMOL/L (ref 7–16)
APPEARANCE UR: ABNORMAL
AST SERPL-CCNC: 19 U/L (ref 12–45)
BACTERIA #/AREA URNS HPF: ABNORMAL /HPF
BASOPHILS # BLD AUTO: 1 % (ref 0–1.8)
BASOPHILS # BLD: 0.06 K/UL (ref 0–0.12)
BILIRUB SERPL-MCNC: 0.3 MG/DL (ref 0.1–1.5)
BILIRUB UR QL STRIP.AUTO: NEGATIVE
BUN SERPL-MCNC: 19 MG/DL (ref 8–22)
CA OXALATE CRYSTAL  1765: PRESENT /HPF
CALCIUM ALBUM COR SERPL-MCNC: 8.7 MG/DL (ref 8.5–10.5)
CALCIUM SERPL-MCNC: 9.1 MG/DL (ref 8.5–10.5)
CASTS URNS QL MICRO: ABNORMAL /LPF (ref 0–2)
CHLORIDE SERPL-SCNC: 104 MMOL/L (ref 96–112)
CO2 SERPL-SCNC: 24 MMOL/L (ref 20–33)
COLOR UR: YELLOW
CREAT SERPL-MCNC: 0.52 MG/DL (ref 0.5–1.4)
EOSINOPHIL # BLD AUTO: 0.19 K/UL (ref 0–0.51)
EOSINOPHIL NFR BLD: 3 % (ref 0–6.9)
EPITHELIAL CELLS 1715: ABNORMAL /HPF (ref 0–5)
ERYTHROCYTE [DISTWIDTH] IN BLOOD BY AUTOMATED COUNT: 41.7 FL (ref 35.9–50)
GFR SERPLBLD CREATININE-BSD FMLA CKD-EPI: 117 ML/MIN/1.73 M 2
GLOBULIN SER CALC-MCNC: 2.9 G/DL (ref 1.9–3.5)
GLUCOSE SERPL-MCNC: 72 MG/DL (ref 65–99)
GLUCOSE UR STRIP.AUTO-MCNC: NEGATIVE MG/DL
HCT VFR BLD AUTO: 44.7 % (ref 37–47)
HGB BLD-MCNC: 15.5 G/DL (ref 12–16)
IMM GRANULOCYTES # BLD AUTO: 0.01 K/UL (ref 0–0.11)
IMM GRANULOCYTES NFR BLD AUTO: 0.2 % (ref 0–0.9)
KETONES UR STRIP.AUTO-MCNC: ABNORMAL MG/DL
LEUKOCYTE ESTERASE UR QL STRIP.AUTO: NEGATIVE
LYMPHOCYTES # BLD AUTO: 2 K/UL (ref 1–4.8)
LYMPHOCYTES NFR BLD: 31.9 % (ref 22–41)
MCH RBC QN AUTO: 32.2 PG (ref 27–33)
MCHC RBC AUTO-ENTMCNC: 34.7 G/DL (ref 32.2–35.5)
MCV RBC AUTO: 92.7 FL (ref 81.4–97.8)
MICRO URNS: ABNORMAL
MONOCYTES # BLD AUTO: 0.49 K/UL (ref 0–0.85)
MONOCYTES NFR BLD AUTO: 7.8 % (ref 0–13.4)
NEUTROPHILS # BLD AUTO: 3.51 K/UL (ref 1.82–7.42)
NEUTROPHILS NFR BLD: 56.1 % (ref 44–72)
NITRITE UR QL STRIP.AUTO: NEGATIVE
NRBC # BLD AUTO: 0 K/UL
NRBC BLD-RTO: 0 /100 WBC (ref 0–0.2)
PH UR STRIP.AUTO: 7.5 [PH] (ref 5–8)
PLATELET # BLD AUTO: 309 K/UL (ref 164–446)
PMV BLD AUTO: 10.1 FL (ref 9–12.9)
POTASSIUM SERPL-SCNC: 4.4 MMOL/L (ref 3.6–5.5)
PROT SERPL-MCNC: 7.4 G/DL (ref 6–8.2)
PROT UR QL STRIP: NEGATIVE MG/DL
RBC # BLD AUTO: 4.82 M/UL (ref 4.2–5.4)
RBC # URNS HPF: ABNORMAL /HPF (ref 0–2)
RBC UR QL AUTO: NEGATIVE
SODIUM SERPL-SCNC: 140 MMOL/L (ref 135–145)
SP GR UR STRIP.AUTO: 1.03
TSH SERPL DL<=0.005 MIU/L-ACNC: 1 UIU/ML (ref 0.38–5.33)
UROBILINOGEN UR STRIP.AUTO-MCNC: 1 EU/DL
WBC # BLD AUTO: 6.3 K/UL (ref 4.8–10.8)
WBC #/AREA URNS HPF: ABNORMAL /HPF

## 2024-11-22 PROCEDURE — 80053 COMPREHEN METABOLIC PANEL: CPT

## 2024-11-22 PROCEDURE — 81001 URINALYSIS AUTO W/SCOPE: CPT

## 2024-11-22 PROCEDURE — 83835 ASSAY OF METANEPHRINES: CPT

## 2024-11-22 PROCEDURE — 85025 COMPLETE CBC W/AUTO DIFF WBC: CPT

## 2024-11-22 PROCEDURE — 84443 ASSAY THYROID STIM HORMONE: CPT

## 2024-11-22 PROCEDURE — 36415 COLL VENOUS BLD VENIPUNCTURE: CPT

## 2024-11-26 LAB
COLLECT DURATION TIME SPEC: NORMAL HR
CREAT 24H UR-MCNC: 167 MG/DL
METANEPH 24H UR-MCNC: 104 UG/L
METANEPH 24H UR-MRATE: NORMAL UG/D (ref 36–229)
METANEPH/CREAT 24H UR: 62 UG/G CRT (ref 0–300)
NORMETANEPHRINE 24H UR-MCNC: 499 UG/L
NORMETANEPHRINE 24H UR-MRATE: NORMAL UG/D (ref 95–650)
NORMETANEPHRINE/CREAT 24H UR: 299 UG/G CRT (ref 0–400)
SPECIMEN VOL ?TM UR: NORMAL ML

## 2024-12-04 ENCOUNTER — APPOINTMENT (OUTPATIENT)
Dept: MEDICAL GROUP | Facility: LAB | Age: 44
End: 2024-12-04
Payer: COMMERCIAL

## 2025-01-08 ENCOUNTER — OFFICE VISIT (OUTPATIENT)
Dept: MEDICAL GROUP | Facility: LAB | Age: 45
End: 2025-01-08
Payer: COMMERCIAL

## 2025-01-08 VITALS
SYSTOLIC BLOOD PRESSURE: 136 MMHG | WEIGHT: 186.8 LBS | OXYGEN SATURATION: 97 % | RESPIRATION RATE: 16 BRPM | TEMPERATURE: 96.5 F | DIASTOLIC BLOOD PRESSURE: 90 MMHG | BODY MASS INDEX: 30.02 KG/M2 | HEART RATE: 97 BPM | HEIGHT: 66 IN

## 2025-01-08 DIAGNOSIS — Z23 NEED FOR VACCINATION: ICD-10-CM

## 2025-01-08 DIAGNOSIS — R00.2 PALPITATIONS: ICD-10-CM

## 2025-01-08 DIAGNOSIS — I10 ESSENTIAL HYPERTENSION: ICD-10-CM

## 2025-01-08 PROCEDURE — 3080F DIAST BP >= 90 MM HG: CPT | Performed by: FAMILY MEDICINE

## 2025-01-08 PROCEDURE — 90656 IIV3 VACC NO PRSV 0.5 ML IM: CPT

## 2025-01-08 PROCEDURE — 3075F SYST BP GE 130 - 139MM HG: CPT | Performed by: FAMILY MEDICINE

## 2025-01-08 PROCEDURE — 99214 OFFICE O/P EST MOD 30 MIN: CPT | Mod: 25 | Performed by: FAMILY MEDICINE

## 2025-01-08 PROCEDURE — 90471 IMMUNIZATION ADMIN: CPT

## 2025-01-08 ASSESSMENT — FIBROSIS 4 INDEX: FIB4 SCORE: 0.59

## 2025-01-08 ASSESSMENT — PATIENT HEALTH QUESTIONNAIRE - PHQ9: CLINICAL INTERPRETATION OF PHQ2 SCORE: 0

## 2025-01-08 NOTE — PROGRESS NOTES
"Subjective:     CC: BP, palpitations    HPI:   Amber presents today to follow-up on blood pressure and discuss palpitations.  Hypertension was a diagnosis 4 months ago and did do some workup for secondary causes given recent significant increase in episodic diaphoresis.  This included workup for pheo which was negative.  Reports home BP about 130s/80s average.  She is having episodes of palpitations with watch alarming for HR>100 at times at night or throughout day without activity.  This is happening near daily.  No associated chest pain or shortness of breath.  1 cup of coffee daily.  No regular alcohol use.    Current Outpatient Medications   Medication Sig Dispense Refill    losartan (COZAAR) 50 MG Tab Take 1 Tablet by mouth every day. Take 1/2 tablet x 1 week then increase to 1 tab daily 90 Tablet 3    rizatriptan (MAXALT) 10 MG tablet TAKE 1 TABLET BY MOUTH ONE TIME AS NEEDED FOR MIGRAINE FOR UP TO 1 DOSE. 10 Tablet 3    venlafaxine (EFFEXOR-XR) 150 MG extended-release capsule TAKE 1 CAPSULE BY MOUTH EVERY DAY 90 Capsule 3    ondansetron (ZOFRAN ODT) 4 MG TABLET DISPERSIBLE Take 1 Tablet by mouth every 8 hours as needed for Nausea/Vomiting. (Patient not taking: Reported on 1/8/2025) 10 Tablet 1     No current facility-administered medications for this visit.       Medications, past medical history, allergies, and social history have been reviewed and updated.      Objective:       Exam:  BP (!) 136/90 (BP Location: Left arm, Patient Position: Sitting, BP Cuff Size: Adult)   Pulse 97   Temp 35.8 °C (96.5 °F) (Temporal)   Resp 16   Ht 1.676 m (5' 6\")   Wt 84.7 kg (186 lb 12.8 oz)   SpO2 97%   BMI 30.15 kg/m²  Body mass index is 30.15 kg/m².    Constitutional: Alert. Well appearing. No distress.  Skin: Warm, dry, good turgor, no visible rashes.  ENMT: Moist mucous membranes. Normal dentition.  Respiratory: Normal effort. Lungs are clear to auscultation bilaterally.  Cardiovascular: Regular rate and rhythm. " Normal S1/S2. No murmurs, rubs or gallops.   Neuro: Moves all four extremities. No facial droop.  Psych: Answers questions appropriately. Normal affect and mood.    EKG interpretation by me: NSR. HR is 80 . Axis is normal. No ST or QRS morphologic changes. No T-wave inversions. OH and QT intervals are normal. Quality of EKG is good.        Assessment & Plan:     44 y.o. female with the following -     1. Essential hypertension  Borderline high today but average at home 130s/80s.  Holding on changing medications.  Could consider addition of beta-blocker for palpitations after workup as below.    2. Palpitations  Palpitations with heart rate on watch noted to frequently be over 100 at rest or without trigger.  EKG with sinus rhythm.  Further evaluation with Holter and echo.  Discussed ER precautions.  - EKG - Clinic Performed  - HOLTER - Cardiology Performed (48HR); Future  - EC-ECHOCARDIOGRAM COMPLETE W/O CONT; Future    3. Need for vaccination  - INFLUENZA VACCINE TRI INJ (PF)      Please note that this note was created using voice recognition software.

## 2025-01-21 ENCOUNTER — TELEPHONE (OUTPATIENT)
Dept: HEALTH INFORMATION MANAGEMENT | Facility: OTHER | Age: 45
End: 2025-01-21
Payer: COMMERCIAL

## 2025-03-10 ENCOUNTER — NON-PROVIDER VISIT (OUTPATIENT)
Dept: CARDIOLOGY | Facility: MEDICAL CENTER | Age: 45
End: 2025-03-10
Attending: FAMILY MEDICINE
Payer: COMMERCIAL

## 2025-03-10 DIAGNOSIS — R00.2 PALPITATIONS: ICD-10-CM

## 2025-03-10 PROCEDURE — 93225 XTRNL ECG REC<48 HRS REC: CPT | Mod: TC

## 2025-03-10 PROCEDURE — 93226 XTRNL ECG REC<48 HR SCAN A/R: CPT | Mod: TC

## 2025-03-10 NOTE — PROGRESS NOTES
Patient enrolled in the 14 day o Holter monitoring program per Joel Wright MD.  >Office hook-up, serial # [id number]  >Currently pending EOS.

## 2025-03-17 ENCOUNTER — TELEPHONE (OUTPATIENT)
Dept: CARDIOLOGY | Facility: MEDICAL CENTER | Age: 45
End: 2025-03-17
Payer: COMMERCIAL

## 2025-03-17 NOTE — TELEPHONE ENCOUNTER
[facility] ePatch EOS to [facility]I for review on 3/18/25 as ADD    Preliminary findings:    Sinus Rhythm  with an avg rate of 93 bpm    32.3% Tachycardia burden    <1.0% Bradycardia burden    <0.01% SVE burden    0.03% PVC burden    2 patient events:  -117      To Patient- These findings are preliminary and HAVE NOT yet been reviewed by your provider. Please allow our team time to review these findings and someone from our office will contact you if any follow up is necessary.

## 2025-03-25 ENCOUNTER — RESULTS FOLLOW-UP (OUTPATIENT)
Dept: MEDICAL GROUP | Facility: LAB | Age: 45
End: 2025-03-25
Payer: COMMERCIAL

## 2025-03-31 ENCOUNTER — ANCILLARY PROCEDURE (OUTPATIENT)
Dept: CARDIOLOGY | Facility: MEDICAL CENTER | Age: 45
End: 2025-03-31
Attending: FAMILY MEDICINE
Payer: COMMERCIAL

## 2025-03-31 DIAGNOSIS — R00.2 PALPITATIONS: ICD-10-CM

## 2025-03-31 LAB
LV EJECT FRACT  99904: 60
LV EJECT FRACT MOD 2C 99903: 59.24
LV EJECT FRACT MOD 4C 99902: 63.04
LV EJECT FRACT MOD BP 99901: 60.61

## 2025-03-31 PROCEDURE — 93306 TTE W/DOPPLER COMPLETE: CPT | Mod: 26 | Performed by: INTERNAL MEDICINE

## 2025-03-31 PROCEDURE — 93306 TTE W/DOPPLER COMPLETE: CPT

## 2025-07-17 ENCOUNTER — PATIENT MESSAGE (OUTPATIENT)
Dept: MEDICAL GROUP | Facility: LAB | Age: 45
End: 2025-07-17
Payer: COMMERCIAL

## 2025-07-17 DIAGNOSIS — Z11.1 TUBERCULOSIS SCREENING: Primary | ICD-10-CM

## 2025-07-24 DIAGNOSIS — F32.1 MODERATE SINGLE CURRENT EPISODE OF MAJOR DEPRESSIVE DISORDER (HCC): ICD-10-CM

## 2025-07-24 RX ORDER — VENLAFAXINE HYDROCHLORIDE 150 MG/1
150 CAPSULE, EXTENDED RELEASE ORAL DAILY
Qty: 90 CAPSULE | Refills: 0 | Status: SHIPPED | OUTPATIENT
Start: 2025-07-24

## 2025-07-29 ENCOUNTER — HOSPITAL ENCOUNTER (OUTPATIENT)
Dept: LAB | Facility: MEDICAL CENTER | Age: 45
End: 2025-07-29
Attending: FAMILY MEDICINE
Payer: COMMERCIAL

## 2025-07-29 DIAGNOSIS — Z11.1 TUBERCULOSIS SCREENING: ICD-10-CM

## 2025-07-29 PROCEDURE — 86480 TB TEST CELL IMMUN MEASURE: CPT

## 2025-07-29 PROCEDURE — 36415 COLL VENOUS BLD VENIPUNCTURE: CPT

## 2025-07-30 LAB
GAMMA INTERFERON BACKGROUND BLD IA-ACNC: 0.03 IU/ML
M TB IFN-G BLD-IMP: NEGATIVE
M TB IFN-G CD4+ BCKGRND COR BLD-ACNC: -0.01 IU/ML
MITOGEN IGNF BCKGRD COR BLD-ACNC: >10 IU/ML
QFT TB2 - NIL TBQ2: 0 IU/ML

## 2025-07-31 ENCOUNTER — RESULTS FOLLOW-UP (OUTPATIENT)
Dept: MEDICAL GROUP | Facility: LAB | Age: 45
End: 2025-07-31
Payer: COMMERCIAL

## 2025-08-15 ENCOUNTER — APPOINTMENT (OUTPATIENT)
Dept: MEDICAL GROUP | Facility: LAB | Age: 45
End: 2025-08-15
Payer: COMMERCIAL